# Patient Record
Sex: MALE | Race: WHITE | Employment: FULL TIME | URBAN - NONMETROPOLITAN AREA
[De-identification: names, ages, dates, MRNs, and addresses within clinical notes are randomized per-mention and may not be internally consistent; named-entity substitution may affect disease eponyms.]

---

## 2017-01-09 ENCOUNTER — TELEPHONE (OUTPATIENT)
Age: 62
End: 2017-01-09

## 2017-01-10 ENCOUNTER — TELEPHONE (OUTPATIENT)
Age: 62
End: 2017-01-10

## 2017-01-19 ENCOUNTER — OFFICE VISIT (OUTPATIENT)
Age: 62
End: 2017-01-19

## 2017-01-19 VITALS
BODY MASS INDEX: 26.18 KG/M2 | SYSTOLIC BLOOD PRESSURE: 134 MMHG | HEART RATE: 65 BPM | RESPIRATION RATE: 16 BRPM | TEMPERATURE: 98.6 F | WEIGHT: 193.3 LBS | OXYGEN SATURATION: 97 % | DIASTOLIC BLOOD PRESSURE: 72 MMHG | HEIGHT: 72 IN

## 2017-01-19 DIAGNOSIS — Z09 S/P RIGHT INGUINAL HERNIA REPAIR, FOLLOW-UP EXAM: Primary | ICD-10-CM

## 2017-01-19 PROCEDURE — 99024 POSTOP FOLLOW-UP VISIT: CPT | Performed by: NURSE PRACTITIONER

## 2017-01-19 RX ORDER — OXYCODONE HYDROCHLORIDE AND ACETAMINOPHEN 5; 325 MG/1; MG/1
1 TABLET ORAL EVERY 6 HOURS PRN
Qty: 20 TABLET | Refills: 0 | Status: SHIPPED | OUTPATIENT
Start: 2017-01-19 | End: 2017-02-07 | Stop reason: ALTCHOICE

## 2017-01-19 ASSESSMENT — ENCOUNTER SYMPTOMS
TROUBLE SWALLOWING: 0
EYE DISCHARGE: 0
SORE THROAT: 0
SINUS PRESSURE: 0
PHOTOPHOBIA: 0
CHEST TIGHTNESS: 0
CONSTIPATION: 0
SHORTNESS OF BREATH: 0
EYE PAIN: 0
APNEA: 0
ABDOMINAL PAIN: 0
DIARRHEA: 0
ANAL BLEEDING: 0
RECTAL PAIN: 0
CHOKING: 0
BACK PAIN: 0
FACIAL SWELLING: 0
STRIDOR: 0
COLOR CHANGE: 0
COUGH: 0
BLOOD IN STOOL: 0
NAUSEA: 0
EYE ITCHING: 0
WHEEZING: 0
RHINORRHEA: 0
EYE REDNESS: 0
VOMITING: 0
VOICE CHANGE: 0
ABDOMINAL DISTENTION: 0

## 2017-02-07 ENCOUNTER — OFFICE VISIT (OUTPATIENT)
Age: 62
End: 2017-02-07

## 2017-02-07 VITALS
DIASTOLIC BLOOD PRESSURE: 70 MMHG | RESPIRATION RATE: 16 BRPM | OXYGEN SATURATION: 96 % | BODY MASS INDEX: 25.35 KG/M2 | WEIGHT: 187.2 LBS | SYSTOLIC BLOOD PRESSURE: 118 MMHG | TEMPERATURE: 98.4 F | HEIGHT: 72 IN | HEART RATE: 76 BPM

## 2017-02-07 DIAGNOSIS — Z87.19 S/P RIGHT INGUINAL HERNIA REPAIR: Primary | ICD-10-CM

## 2017-02-07 DIAGNOSIS — Z98.890 S/P RIGHT INGUINAL HERNIA REPAIR: Primary | ICD-10-CM

## 2017-02-07 PROCEDURE — 99024 POSTOP FOLLOW-UP VISIT: CPT | Performed by: NURSE PRACTITIONER

## 2017-02-07 RX ORDER — IBUPROFEN 200 MG
200 TABLET ORAL PRN
COMMUNITY

## 2017-02-07 ASSESSMENT — ENCOUNTER SYMPTOMS
BLOOD IN STOOL: 0
EYE REDNESS: 0
RHINORRHEA: 0
FACIAL SWELLING: 0
EYE DISCHARGE: 0
SHORTNESS OF BREATH: 0
CHOKING: 0
CONSTIPATION: 0
TROUBLE SWALLOWING: 0
DIARRHEA: 0
BACK PAIN: 0
VOICE CHANGE: 0
VOMITING: 0
EYE ITCHING: 0
COUGH: 0
WHEEZING: 0
SORE THROAT: 0
RECTAL PAIN: 0
PHOTOPHOBIA: 0
ABDOMINAL PAIN: 0
COLOR CHANGE: 0
APNEA: 0
SINUS PRESSURE: 0
ABDOMINAL DISTENTION: 0
CHEST TIGHTNESS: 0
NAUSEA: 0
STRIDOR: 0
EYE PAIN: 0
ANAL BLEEDING: 0

## 2024-05-20 ENCOUNTER — APPOINTMENT (OUTPATIENT)
Dept: CT IMAGING | Age: 69
End: 2024-05-20
Payer: MEDICARE

## 2024-05-20 ENCOUNTER — HOSPITAL ENCOUNTER (INPATIENT)
Age: 69
LOS: 4 days | Discharge: HOME | End: 2024-05-25
Attending: EMERGENCY MEDICINE | Admitting: SURGERY
Payer: MEDICARE

## 2024-05-20 ENCOUNTER — APPOINTMENT (OUTPATIENT)
Dept: GENERAL RADIOLOGY | Age: 69
End: 2024-05-20
Payer: MEDICARE

## 2024-05-20 DIAGNOSIS — J94.2 HEMOTHORAX ON RIGHT: ICD-10-CM

## 2024-05-20 DIAGNOSIS — S22.41XA MULTIPLE FRACTURES OF RIBS, RIGHT SIDE, INITIAL ENCOUNTER FOR CLOSED FRACTURE: Primary | ICD-10-CM

## 2024-05-20 DIAGNOSIS — I48.91 ATRIAL FIBRILLATION WITH RVR (HCC): ICD-10-CM
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
## 2024-05-20 PROCEDURE — 6820000001 HC L2 TRAUMA SURGERY EVALUATION: Performed by: SURGERY

## 2024-05-20 PROCEDURE — 99222 1ST HOSP IP/OBS MODERATE 55: CPT | Performed by: SURGERY

## 2024-05-20 PROCEDURE — 2580000003 HC RX 258: Performed by: EMERGENCY MEDICINE

## 2024-05-20 PROCEDURE — 6360000002 HC RX W HCPCS: Performed by: EMERGENCY MEDICINE

## 2024-05-20 PROCEDURE — 74177 CT ABD & PELVIS W/CONTRAST: CPT

## 2024-05-20 PROCEDURE — 80048 BASIC METABOLIC PNL TOTAL CA: CPT

## 2024-05-20 PROCEDURE — 6360000004 HC RX CONTRAST MEDICATION: Performed by: EMERGENCY MEDICINE

## 2024-05-20 PROCEDURE — 96374 THER/PROPH/DIAG INJ IV PUSH: CPT

## 2024-05-20 PROCEDURE — 99285 EMERGENCY DEPT VISIT HI MDM: CPT

## 2024-05-20 PROCEDURE — 36415 COLL VENOUS BLD VENIPUNCTURE: CPT

## 2024-05-20 PROCEDURE — 85025 COMPLETE CBC W/AUTO DIFF WBC: CPT

## 2024-05-20 RX ORDER — 0.9 % SODIUM CHLORIDE 0.9 %
250 INTRAVENOUS SOLUTION INTRAVENOUS ONCE
Status: COMPLETED | OUTPATIENT
Start: 2024-05-21 | End: 2024-05-21

## 2024-05-20 RX ORDER — MORPHINE SULFATE 4 MG/ML
4 INJECTION, SOLUTION INTRAMUSCULAR; INTRAVENOUS ONCE
Status: COMPLETED | OUTPATIENT
Start: 2024-05-21 | End: 2024-05-20

## 2024-05-20 RX ADMIN — MORPHINE SULFATE 4 MG: 4 INJECTION, SOLUTION INTRAMUSCULAR; INTRAVENOUS at 23:53

## 2024-05-20 RX ADMIN — IOPAMIDOL 80 ML: 755 INJECTION, SOLUTION INTRAVENOUS at 23:25

## 2024-05-20 RX ADMIN — SODIUM CHLORIDE 250 ML: 9 INJECTION, SOLUTION INTRAVENOUS at 23:55

## 2024-05-20 ASSESSMENT — PAIN - FUNCTIONAL ASSESSMENT
PAIN_FUNCTIONAL_ASSESSMENT: NONE - DENIES PAIN
PAIN_FUNCTIONAL_ASSESSMENT: NONE - DENIES PAIN

## 2024-05-21 ENCOUNTER — APPOINTMENT (OUTPATIENT)
Dept: CT IMAGING | Age: 69
End: 2024-05-21
Payer: MEDICARE

## 2024-05-21 ENCOUNTER — APPOINTMENT (OUTPATIENT)
Dept: GENERAL RADIOLOGY | Age: 69
End: 2024-05-21
Payer: MEDICARE

## 2024-05-21 PROBLEM — F10.90 ALCOHOL USE DISORDER: Status: ACTIVE | Noted: 2024-05-21
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
## 2024-05-21 PROBLEM — S20.211A: Status: ACTIVE | Noted: 2024-05-21

## 2024-05-21 PROBLEM — F17.200 NICOTINE DEPENDENCE WITH CURRENT USE: Status: ACTIVE | Noted: 2024-05-21

## 2024-05-21 PROBLEM — S80.10XA: Status: ACTIVE | Noted: 2024-05-21

## 2024-05-21 PROBLEM — T79.7XXA SUBCUTANEOUS EMPHYSEMA DUE TO TRAUMA, INITIAL ENCOUNTER (HCC): Status: ACTIVE | Noted: 2024-05-21

## 2024-05-21 LAB
ALBUMIN SERPL BCG-MCNC: 2.3 G/DL (ref 3.5–5.1)
ALP SERPL-CCNC: 52 U/L (ref 38–126)
ALT SERPL W/O P-5'-P-CCNC: 8 U/L (ref 11–66)
ANION GAP SERPL CALC-SCNC: 10 MEQ/L (ref 8–16)
ANION GAP SERPL CALC-SCNC: 13 MEQ/L (ref 8–16)
ANION GAP SERPL CALC-SCNC: 9 MEQ/L (ref 8–16)
ANION GAP SERPL CALC-SCNC: 9 MEQ/L (ref 8–16)
APTT PPP: 32.8 SECONDS (ref 22–38)
AST SERPL-CCNC: 13 U/L (ref 5–40)
BASOPHILS ABSOLUTE: 0.1 THOU/MM3 (ref 0–0.1)
BASOPHILS ABSOLUTE: 0.1 THOU/MM3 (ref 0–0.1)
BASOPHILS NFR BLD AUTO: 0.4 %
BASOPHILS NFR BLD AUTO: 0.5 %
BILIRUB CONJ SERPL-MCNC: < 0.2 MG/DL (ref 0–0.3)
BILIRUB SERPL-MCNC: 0.3 MG/DL (ref 0.3–1.2)
BUN SERPL-MCNC: 13 MG/DL (ref 7–22)
BUN SERPL-MCNC: 14 MG/DL (ref 7–22)
BUN SERPL-MCNC: 14 MG/DL (ref 7–22)
BUN SERPL-MCNC: 15 MG/DL (ref 7–22)
CA-I BLD ISE-SCNC: 1.11 MMOL/L (ref 1.12–1.32)
CALCIUM SERPL-MCNC: 7.5 MG/DL (ref 8.5–10.5)
CALCIUM SERPL-MCNC: 8.3 MG/DL (ref 8.5–10.5)
CALCIUM SERPL-MCNC: 8.8 MG/DL (ref 8.5–10.5)
CALCIUM SERPL-MCNC: 9.6 MG/DL (ref 8.5–10.5)
CHLORIDE SERPL-SCNC: 100 MEQ/L (ref 98–111)
CHLORIDE SERPL-SCNC: 95 MEQ/L (ref 98–111)
CHLORIDE SERPL-SCNC: 95 MEQ/L (ref 98–111)
CHLORIDE SERPL-SCNC: 96 MEQ/L (ref 98–111)
CO2 SERPL-SCNC: 22 MEQ/L (ref 23–33)
CO2 SERPL-SCNC: 22 MEQ/L (ref 23–33)
CO2 SERPL-SCNC: 23 MEQ/L (ref 23–33)
CO2 SERPL-SCNC: 26 MEQ/L (ref 23–33)
CREAT SERPL-MCNC: 0.5 MG/DL (ref 0.4–1.2)
CREAT SERPL-MCNC: 0.7 MG/DL (ref 0.4–1.2)
CREAT UR-MCNC: 186.6 MG/DL
DEPRECATED RDW RBC AUTO: 50.4 FL (ref 35–45)
DEPRECATED RDW RBC AUTO: 51.4 FL (ref 35–45)
EKG ATRIAL RATE: 79 BPM
EKG P AXIS: 63 DEGREES
EKG P-R INTERVAL: 156 MS
EKG Q-T INTERVAL: 420 MS
EKG QRS DURATION: 102 MS
EKG QTC CALCULATION (BAZETT): 481 MS
EKG R AXIS: 54 DEGREES
EKG T AXIS: 52 DEGREES
EKG VENTRICULAR RATE: 79 BPM
EOSINOPHIL NFR BLD AUTO: 0 %
EOSINOPHIL NFR BLD AUTO: 1 %
EOSINOPHILS ABSOLUTE: 0 THOU/MM3 (ref 0–0.4)
EOSINOPHILS ABSOLUTE: 0.2 THOU/MM3 (ref 0–0.4)
ERYTHROCYTE [DISTWIDTH] IN BLOOD BY AUTOMATED COUNT: 13.2 % (ref 11.5–14.5)
ERYTHROCYTE [DISTWIDTH] IN BLOOD BY AUTOMATED COUNT: 13.3 % (ref 11.5–14.5)
GFR SERPL CREATININE-BSD FRML MDRD: > 90 ML/MIN/1.73M2
GLUCOSE SERPL-MCNC: 107 MG/DL (ref 70–108)
GLUCOSE SERPL-MCNC: 123 MG/DL (ref 70–108)
GLUCOSE SERPL-MCNC: 125 MG/DL (ref 70–108)
GLUCOSE SERPL-MCNC: 126 MG/DL (ref 70–108)
HCT VFR BLD AUTO: 35.8 % (ref 42–52)
HCT VFR BLD AUTO: 37.6 % (ref 42–52)
HCT VFR BLD AUTO: 38.4 % (ref 42–52)
HEPARIN UNFRACTIONATED: < 0.04 U/ML (ref 0.3–0.7)
HGB BLD-MCNC: 12.3 GM/DL (ref 14–18)
HGB BLD-MCNC: 12.8 GM/DL (ref 14–18)
HGB BLD-MCNC: 13.2 GM/DL (ref 14–18)
IMM GRANULOCYTES # BLD AUTO: 0.28 THOU/MM3 (ref 0–0.07)
IMM GRANULOCYTES # BLD AUTO: 0.46 THOU/MM3 (ref 0–0.07)
IMM GRANULOCYTES NFR BLD AUTO: 1.8 %
IMM GRANULOCYTES NFR BLD AUTO: 1.9 %
INR PPP: 0.99 (ref 0.85–1.13)
LYMPHOCYTES ABSOLUTE: 0.8 THOU/MM3 (ref 1–4.8)
LYMPHOCYTES ABSOLUTE: 0.9 THOU/MM3 (ref 1–4.8)
LYMPHOCYTES NFR BLD AUTO: 3.6 %
LYMPHOCYTES NFR BLD AUTO: 5.3 %
MAGNESIUM SERPL-MCNC: 1.8 MG/DL (ref 1.6–2.4)
MCH RBC QN AUTO: 36.1 PG (ref 26–33)
MCH RBC QN AUTO: 36.5 PG (ref 26–33)
MCHC RBC AUTO-ENTMCNC: 34.4 GM/DL (ref 32.2–35.5)
MCHC RBC AUTO-ENTMCNC: 35.1 GM/DL (ref 32.2–35.5)
MCV RBC AUTO: 103.9 FL (ref 80–94)
MCV RBC AUTO: 105 FL (ref 80–94)
MONOCYTES ABSOLUTE: 2 THOU/MM3 (ref 0.4–1.3)
MONOCYTES ABSOLUTE: 2.3 THOU/MM3 (ref 0.4–1.3)
MONOCYTES NFR BLD AUTO: 12.6 %
MONOCYTES NFR BLD AUTO: 9.4 %
MRSA DNA SPEC QL NAA+PROBE: NEGATIVE
NEUTROPHILS ABSOLUTE: 12.2 THOU/MM3 (ref 1.8–7.7)
NEUTROPHILS ABSOLUTE: 20.8 THOU/MM3 (ref 1.8–7.7)
NEUTROPHILS NFR BLD AUTO: 78.8 %
NEUTROPHILS NFR BLD AUTO: 84.7 %
NRBC BLD AUTO-RTO: 0 /100 WBC
NRBC BLD AUTO-RTO: 0 /100 WBC
NT-PROBNP SERPL IA-MCNC: 3205 PG/ML (ref 0–124)
OSMOLALITY SERPL CALC.SUM OF ELEC: 264.5 MOSMOL/KG (ref 275–300)
OSMOLALITY SERPL: NORMAL MOSMOL/KG (ref 275–295)
OSMOLALITY UR: NORMAL MOSMOL/KG (ref 250–750)
PATHOLOGIST REVIEW: ABNORMAL
PH BLDV: 7.42 [PH] (ref 7.31–7.41)
PHOSPHATE SERPL-MCNC: 2.5 MG/DL (ref 2.4–4.7)
PLATELET # BLD AUTO: 275 THOU/MM3 (ref 130–400)
PLATELET # BLD AUTO: 311 THOU/MM3 (ref 130–400)
PLATELET BLD QL SMEAR: ADEQUATE
PMV BLD AUTO: 8.9 FL (ref 9.4–12.4)
PMV BLD AUTO: 9 FL (ref 9.4–12.4)
POTASSIUM SERPL-SCNC: 3.9 MEQ/L (ref 3.5–5.2)
POTASSIUM SERPL-SCNC: 4.7 MEQ/L (ref 3.5–5.2)
POTASSIUM SERPL-SCNC: 4.8 MEQ/L (ref 3.5–5.2)
POTASSIUM SERPL-SCNC: 4.9 MEQ/L (ref 3.5–5.2)
PROT SERPL-MCNC: 4.5 G/DL (ref 6.1–8)
RBC # BLD AUTO: 3.41 MILL/MM3 (ref 4.7–6.1)
RBC # BLD AUTO: 3.62 MILL/MM3 (ref 4.7–6.1)
SCAN OF BLOOD SMEAR: NORMAL
SODIUM SERPL-SCNC: 128 MEQ/L (ref 135–145)
SODIUM SERPL-SCNC: 130 MEQ/L (ref 135–145)
SODIUM SERPL-SCNC: 131 MEQ/L (ref 135–145)
SODIUM SERPL-SCNC: 131 MEQ/L (ref 135–145)
SODIUM UR-SCNC: 75 MEQ/L
TROPONIN, HIGH SENSITIVITY: 21 NG/L (ref 0–12)
TROPONIN, HIGH SENSITIVITY: 23 NG/L (ref 0–12)
TROPONIN, HIGH SENSITIVITY: 26 NG/L (ref 0–12)
TROPONIN, HIGH SENSITIVITY: 32 NG/L (ref 0–12)
TROPONIN, HIGH SENSITIVITY: 36 NG/L (ref 0–12)
TSH SERPL DL<=0.005 MIU/L-ACNC: 1.82 UIU/ML (ref 0.4–4.2)
URATE SERPL-MCNC: 3.6 MG/DL (ref 3.7–7)
WBC # BLD AUTO: 15.5 THOU/MM3 (ref 4.8–10.8)
WBC # BLD AUTO: 24.5 THOU/MM3 (ref 4.8–10.8)

## 2024-05-21 PROCEDURE — 71045 X-RAY EXAM CHEST 1 VIEW: CPT

## 2024-05-21 PROCEDURE — 6360000002 HC RX W HCPCS

## 2024-05-21 PROCEDURE — 2500000003 HC RX 250 WO HCPCS

## 2024-05-21 PROCEDURE — 2500000003 HC RX 250 WO HCPCS: Performed by: NURSE PRACTITIONER

## 2024-05-21 PROCEDURE — 6360000002 HC RX W HCPCS: Performed by: NURSE PRACTITIONER

## 2024-05-21 PROCEDURE — 93005 ELECTROCARDIOGRAM TRACING: CPT

## 2024-05-21 PROCEDURE — 85014 HEMATOCRIT: CPT

## 2024-05-21 PROCEDURE — 99223 1ST HOSP IP/OBS HIGH 75: CPT | Performed by: NURSE PRACTITIONER

## 2024-05-21 PROCEDURE — 93005 ELECTROCARDIOGRAM TRACING: CPT | Performed by: SURGERY

## 2024-05-21 PROCEDURE — 2580000003 HC RX 258: Performed by: NURSE PRACTITIONER

## 2024-05-21 PROCEDURE — 85610 PROTHROMBIN TIME: CPT

## 2024-05-21 PROCEDURE — 82248 BILIRUBIN DIRECT: CPT

## 2024-05-21 PROCEDURE — 85730 THROMBOPLASTIN TIME PARTIAL: CPT

## 2024-05-21 PROCEDURE — 6360000002 HC RX W HCPCS: Performed by: RADIOLOGY

## 2024-05-21 PROCEDURE — 83735 ASSAY OF MAGNESIUM: CPT

## 2024-05-21 PROCEDURE — 1200000003 HC TELEMETRY R&B

## 2024-05-21 PROCEDURE — 93010 ELECTROCARDIOGRAM REPORT: CPT | Performed by: INTERNAL MEDICINE

## 2024-05-21 PROCEDURE — 84100 ASSAY OF PHOSPHORUS: CPT

## 2024-05-21 PROCEDURE — 82800 BLOOD PH: CPT

## 2024-05-21 PROCEDURE — 85018 HEMOGLOBIN: CPT

## 2024-05-21 PROCEDURE — 1200000000 HC SEMI PRIVATE

## 2024-05-21 PROCEDURE — 84443 ASSAY THYROID STIM HORMONE: CPT

## 2024-05-21 PROCEDURE — 6370000000 HC RX 637 (ALT 250 FOR IP): Performed by: NURSE PRACTITIONER

## 2024-05-21 PROCEDURE — 85025 COMPLETE CBC W/AUTO DIFF WBC: CPT

## 2024-05-21 PROCEDURE — 84300 ASSAY OF URINE SODIUM: CPT

## 2024-05-21 PROCEDURE — 0W9930Z DRAINAGE OF RIGHT PLEURAL CAVITY WITH DRAINAGE DEVICE, PERCUTANEOUS APPROACH: ICD-10-PCS | Performed by: RADIOLOGY

## 2024-05-21 PROCEDURE — 32557 INSERT CATH PLEURA W/ IMAGE: CPT

## 2024-05-21 PROCEDURE — 6370000000 HC RX 637 (ALT 250 FOR IP)

## 2024-05-21 PROCEDURE — 85520 HEPARIN ASSAY: CPT

## 2024-05-21 PROCEDURE — 84484 ASSAY OF TROPONIN QUANT: CPT

## 2024-05-21 PROCEDURE — 36415 COLL VENOUS BLD VENIPUNCTURE: CPT

## 2024-05-21 PROCEDURE — 83935 ASSAY OF URINE OSMOLALITY: CPT

## 2024-05-21 PROCEDURE — 84550 ASSAY OF BLOOD/URIC ACID: CPT

## 2024-05-21 PROCEDURE — 82330 ASSAY OF CALCIUM: CPT

## 2024-05-21 PROCEDURE — 83880 ASSAY OF NATRIURETIC PEPTIDE: CPT

## 2024-05-21 PROCEDURE — 83930 ASSAY OF BLOOD OSMOLALITY: CPT

## 2024-05-21 PROCEDURE — 94799 UNLISTED PULMONARY SVC/PX: CPT

## 2024-05-21 PROCEDURE — 99232 SBSQ HOSP IP/OBS MODERATE 35: CPT | Performed by: SURGERY

## 2024-05-21 PROCEDURE — 87641 MR-STAPH DNA AMP PROBE: CPT

## 2024-05-21 PROCEDURE — 94010 BREATHING CAPACITY TEST: CPT

## 2024-05-21 PROCEDURE — 70450 CT HEAD/BRAIN W/O DYE: CPT

## 2024-05-21 PROCEDURE — 80053 COMPREHEN METABOLIC PANEL: CPT

## 2024-05-21 PROCEDURE — 82570 ASSAY OF URINE CREATININE: CPT

## 2024-05-21 RX ORDER — SODIUM CHLORIDE 0.9 % (FLUSH) 0.9 %
5-40 SYRINGE (ML) INJECTION EVERY 12 HOURS SCHEDULED
Status: DISCONTINUED | OUTPATIENT
Start: 2024-05-21 | End: 2024-05-25 | Stop reason: HOSPADM

## 2024-05-21 RX ORDER — LORAZEPAM 1 MG/1
4 TABLET ORAL
Status: DISCONTINUED | OUTPATIENT
Start: 2024-05-21 | End: 2024-05-25 | Stop reason: HOSPADM

## 2024-05-21 RX ORDER — VALSARTAN 320 MG/1
320 TABLET ORAL DAILY
COMMUNITY
Start: 2023-12-18

## 2024-05-21 RX ORDER — HEPARIN SODIUM 1000 [USP'U]/ML
4000 INJECTION, SOLUTION INTRAVENOUS; SUBCUTANEOUS PRN
Status: DISCONTINUED | OUTPATIENT
Start: 2024-05-21 | End: 2024-05-24

## 2024-05-21 RX ORDER — VALSARTAN 320 MG/1
320 TABLET ORAL DAILY
Status: DISCONTINUED | OUTPATIENT
Start: 2024-05-21 | End: 2024-05-23

## 2024-05-21 RX ORDER — LANOLIN ALCOHOL/MO/W.PET/CERES
100 CREAM (GRAM) TOPICAL DAILY
Status: DISCONTINUED | OUTPATIENT
Start: 2024-05-21 | End: 2024-05-25 | Stop reason: HOSPADM

## 2024-05-21 RX ORDER — LORAZEPAM 2 MG/ML
3 INJECTION INTRAMUSCULAR
Status: DISCONTINUED | OUTPATIENT
Start: 2024-05-21 | End: 2024-05-25 | Stop reason: HOSPADM

## 2024-05-21 RX ORDER — SODIUM CHLORIDE 0.9 % (FLUSH) 0.9 %
5-40 SYRINGE (ML) INJECTION EVERY 12 HOURS SCHEDULED
Status: DISCONTINUED | OUTPATIENT
Start: 2024-05-21 | End: 2024-05-21 | Stop reason: SDUPTHER

## 2024-05-21 RX ORDER — PAROXETINE HYDROCHLORIDE 20 MG/1
20 TABLET, FILM COATED ORAL EVERY MORNING
Status: DISCONTINUED | OUTPATIENT
Start: 2024-05-22 | End: 2024-05-25 | Stop reason: HOSPADM

## 2024-05-21 RX ORDER — LORAZEPAM 1 MG/1
1 TABLET ORAL
Status: DISCONTINUED | OUTPATIENT
Start: 2024-05-21 | End: 2024-05-25 | Stop reason: HOSPADM

## 2024-05-21 RX ORDER — LORAZEPAM 1 MG/1
2 TABLET ORAL
Status: DISCONTINUED | OUTPATIENT
Start: 2024-05-21 | End: 2024-05-25 | Stop reason: HOSPADM

## 2024-05-21 RX ORDER — POLYETHYLENE GLYCOL 3350 17 G/17G
17 POWDER, FOR SOLUTION ORAL DAILY
Status: DISCONTINUED | OUTPATIENT
Start: 2024-05-21 | End: 2024-05-25 | Stop reason: HOSPADM

## 2024-05-21 RX ORDER — POTASSIUM CHLORIDE 29.8 MG/ML
20 INJECTION INTRAVENOUS PRN
Status: DISCONTINUED | OUTPATIENT
Start: 2024-05-21 | End: 2024-05-25 | Stop reason: HOSPADM

## 2024-05-21 RX ORDER — MIDAZOLAM HYDROCHLORIDE 1 MG/ML
INJECTION INTRAMUSCULAR; INTRAVENOUS PRN
Status: COMPLETED | OUTPATIENT
Start: 2024-05-21 | End: 2024-05-21

## 2024-05-21 RX ORDER — LORAZEPAM 2 MG/ML
2 INJECTION INTRAMUSCULAR
Status: DISCONTINUED | OUTPATIENT
Start: 2024-05-21 | End: 2024-05-25 | Stop reason: HOSPADM

## 2024-05-21 RX ORDER — FENTANYL CITRATE 50 UG/ML
INJECTION, SOLUTION INTRAMUSCULAR; INTRAVENOUS PRN
Status: COMPLETED | OUTPATIENT
Start: 2024-05-21 | End: 2024-05-21

## 2024-05-21 RX ORDER — LORAZEPAM 2 MG/ML
INJECTION INTRAMUSCULAR
Status: COMPLETED
Start: 2024-05-21 | End: 2024-05-21

## 2024-05-21 RX ORDER — ONDANSETRON 4 MG/1
4 TABLET, ORALLY DISINTEGRATING ORAL EVERY 8 HOURS PRN
Status: DISCONTINUED | OUTPATIENT
Start: 2024-05-21 | End: 2024-05-25 | Stop reason: HOSPADM

## 2024-05-21 RX ORDER — SODIUM CHLORIDE 0.9 % (FLUSH) 0.9 %
5-40 SYRINGE (ML) INJECTION PRN
Status: DISCONTINUED | OUTPATIENT
Start: 2024-05-21 | End: 2024-05-21 | Stop reason: SDUPTHER

## 2024-05-21 RX ORDER — SODIUM CHLORIDE 9 MG/ML
INJECTION, SOLUTION INTRAVENOUS CONTINUOUS
Status: DISCONTINUED | OUTPATIENT
Start: 2024-05-21 | End: 2024-05-21

## 2024-05-21 RX ORDER — LORAZEPAM 1 MG/1
3 TABLET ORAL
Status: DISCONTINUED | OUTPATIENT
Start: 2024-05-21 | End: 2024-05-25 | Stop reason: HOSPADM

## 2024-05-21 RX ORDER — SODIUM CHLORIDE 0.9 % (FLUSH) 0.9 %
5-40 SYRINGE (ML) INJECTION PRN
Status: DISCONTINUED | OUTPATIENT
Start: 2024-05-21 | End: 2024-05-25 | Stop reason: HOSPADM

## 2024-05-21 RX ORDER — SODIUM CHLORIDE 9 MG/ML
INJECTION, SOLUTION INTRAVENOUS PRN
Status: DISCONTINUED | OUTPATIENT
Start: 2024-05-21 | End: 2024-05-21 | Stop reason: SDUPTHER

## 2024-05-21 RX ORDER — DILTIAZEM HYDROCHLORIDE 5 MG/ML
20 INJECTION INTRAVENOUS ONCE
Status: COMPLETED | OUTPATIENT
Start: 2024-05-21 | End: 2024-05-21

## 2024-05-21 RX ORDER — METHOCARBAMOL 500 MG/1
1000 TABLET, FILM COATED ORAL 4 TIMES DAILY
Status: DISCONTINUED | OUTPATIENT
Start: 2024-05-21 | End: 2024-05-25 | Stop reason: HOSPADM

## 2024-05-21 RX ORDER — POTASSIUM CHLORIDE 7.45 MG/ML
10 INJECTION INTRAVENOUS PRN
Status: DISCONTINUED | OUTPATIENT
Start: 2024-05-21 | End: 2024-05-25 | Stop reason: HOSPADM

## 2024-05-21 RX ORDER — HEPARIN SODIUM 1000 [USP'U]/ML
4000 INJECTION, SOLUTION INTRAVENOUS; SUBCUTANEOUS ONCE
Status: COMPLETED | OUTPATIENT
Start: 2024-05-21 | End: 2024-05-21

## 2024-05-21 RX ORDER — LORAZEPAM 2 MG/ML
1 INJECTION INTRAMUSCULAR
Status: DISCONTINUED | OUTPATIENT
Start: 2024-05-21 | End: 2024-05-25 | Stop reason: HOSPADM

## 2024-05-21 RX ORDER — CALCIUM GLUCONATE 20 MG/ML
2000 INJECTION, SOLUTION INTRAVENOUS PRN
Status: DISCONTINUED | OUTPATIENT
Start: 2024-05-21 | End: 2024-05-25 | Stop reason: HOSPADM

## 2024-05-21 RX ORDER — SODIUM CHLORIDE 9 MG/ML
INJECTION, SOLUTION INTRAVENOUS PRN
Status: DISCONTINUED | OUTPATIENT
Start: 2024-05-21 | End: 2024-05-25 | Stop reason: HOSPADM

## 2024-05-21 RX ORDER — ONDANSETRON 2 MG/ML
4 INJECTION INTRAMUSCULAR; INTRAVENOUS EVERY 6 HOURS PRN
Status: DISCONTINUED | OUTPATIENT
Start: 2024-05-21 | End: 2024-05-25 | Stop reason: HOSPADM

## 2024-05-21 RX ORDER — HEPARIN SODIUM 1000 [USP'U]/ML
2000 INJECTION, SOLUTION INTRAVENOUS; SUBCUTANEOUS PRN
Status: DISCONTINUED | OUTPATIENT
Start: 2024-05-21 | End: 2024-05-24

## 2024-05-21 RX ORDER — LORAZEPAM 2 MG/ML
4 INJECTION INTRAMUSCULAR
Status: DISCONTINUED | OUTPATIENT
Start: 2024-05-21 | End: 2024-05-25 | Stop reason: HOSPADM

## 2024-05-21 RX ORDER — FAMOTIDINE 20 MG/1
20 TABLET, FILM COATED ORAL 2 TIMES DAILY
Status: DISCONTINUED | OUTPATIENT
Start: 2024-05-21 | End: 2024-05-25 | Stop reason: HOSPADM

## 2024-05-21 RX ORDER — MORPHINE SULFATE 4 MG/ML
4 INJECTION, SOLUTION INTRAMUSCULAR; INTRAVENOUS
Status: DISCONTINUED | OUTPATIENT
Start: 2024-05-21 | End: 2024-05-25 | Stop reason: HOSPADM

## 2024-05-21 RX ORDER — MAGNESIUM SULFATE IN WATER 40 MG/ML
2000 INJECTION, SOLUTION INTRAVENOUS PRN
Status: DISCONTINUED | OUTPATIENT
Start: 2024-05-21 | End: 2024-05-25 | Stop reason: HOSPADM

## 2024-05-21 RX ORDER — MULTIVITAMIN WITH IRON
1 TABLET ORAL DAILY
Status: DISCONTINUED | OUTPATIENT
Start: 2024-05-21 | End: 2024-05-25 | Stop reason: HOSPADM

## 2024-05-21 RX ORDER — FOLIC ACID 1 MG/1
1 TABLET ORAL DAILY
Status: DISCONTINUED | OUTPATIENT
Start: 2024-05-21 | End: 2024-05-25 | Stop reason: HOSPADM

## 2024-05-21 RX ORDER — ACETAMINOPHEN 325 MG/1
650 TABLET ORAL EVERY 4 HOURS PRN
Status: DISCONTINUED | OUTPATIENT
Start: 2024-05-21 | End: 2024-05-24

## 2024-05-21 RX ORDER — LIDOCAINE 4 G/G
3 PATCH TOPICAL DAILY
Status: DISCONTINUED | OUTPATIENT
Start: 2024-05-21 | End: 2024-05-25 | Stop reason: HOSPADM

## 2024-05-21 RX ORDER — METOPROLOL TARTRATE 1 MG/ML
INJECTION, SOLUTION INTRAVENOUS
Status: COMPLETED
Start: 2024-05-21 | End: 2024-05-21

## 2024-05-21 RX ORDER — HYDROCODONE BITARTRATE AND ACETAMINOPHEN 5; 325 MG/1; MG/1
2 TABLET ORAL EVERY 4 HOURS PRN
Status: DISCONTINUED | OUTPATIENT
Start: 2024-05-21 | End: 2024-05-24

## 2024-05-21 RX ORDER — MORPHINE SULFATE 2 MG/ML
2 INJECTION, SOLUTION INTRAMUSCULAR; INTRAVENOUS
Status: DISCONTINUED | OUTPATIENT
Start: 2024-05-21 | End: 2024-05-25 | Stop reason: HOSPADM

## 2024-05-21 RX ORDER — METOPROLOL TARTRATE 1 MG/ML
5 INJECTION, SOLUTION INTRAVENOUS EVERY 6 HOURS PRN
Status: DISCONTINUED | OUTPATIENT
Start: 2024-05-21 | End: 2024-05-25 | Stop reason: HOSPADM

## 2024-05-21 RX ORDER — HEPARIN SODIUM 10000 [USP'U]/100ML
5-30 INJECTION, SOLUTION INTRAVENOUS CONTINUOUS
Status: DISCONTINUED | OUTPATIENT
Start: 2024-05-21 | End: 2024-05-23

## 2024-05-21 RX ORDER — HYDROCODONE BITARTRATE AND ACETAMINOPHEN 5; 325 MG/1; MG/1
1 TABLET ORAL EVERY 4 HOURS PRN
Status: DISCONTINUED | OUTPATIENT
Start: 2024-05-21 | End: 2024-05-24

## 2024-05-21 RX ORDER — LORAZEPAM 2 MG/ML
1 INJECTION INTRAMUSCULAR ONCE
Status: COMPLETED | OUTPATIENT
Start: 2024-05-21 | End: 2024-05-21

## 2024-05-21 RX ADMIN — VALSARTAN 320 MG: 320 TABLET, FILM COATED ORAL at 21:48

## 2024-05-21 RX ADMIN — METHOCARBAMOL 1000 MG: 500 TABLET ORAL at 12:34

## 2024-05-21 RX ADMIN — METOPROLOL TARTRATE 5 MG: 1 INJECTION, SOLUTION INTRAVENOUS at 17:20

## 2024-05-21 RX ADMIN — FENTANYL CITRATE 50 MCG: 50 INJECTION, SOLUTION INTRAMUSCULAR; INTRAVENOUS at 13:46

## 2024-05-21 RX ADMIN — HEPARIN SODIUM 4000 UNITS: 1000 INJECTION INTRAVENOUS; SUBCUTANEOUS at 23:01

## 2024-05-21 RX ADMIN — METOPROLOL TARTRATE 5 MG: 5 INJECTION INTRAVENOUS at 17:20

## 2024-05-21 RX ADMIN — LORAZEPAM 1 MG: 2 INJECTION INTRAMUSCULAR at 18:28

## 2024-05-21 RX ADMIN — DILTIAZEM HYDROCHLORIDE 5 MG/HR: 5 INJECTION, SOLUTION INTRAVENOUS at 21:18

## 2024-05-21 RX ADMIN — HYDROCODONE BITARTRATE AND ACETAMINOPHEN 2 TABLET: 5; 325 TABLET ORAL at 10:12

## 2024-05-21 RX ADMIN — Medication 100 MG: at 10:09

## 2024-05-21 RX ADMIN — SODIUM CHLORIDE, PRESERVATIVE FREE 10 ML: 5 INJECTION INTRAVENOUS at 10:10

## 2024-05-21 RX ADMIN — FAMOTIDINE 20 MG: 20 TABLET, FILM COATED ORAL at 21:47

## 2024-05-21 RX ADMIN — SODIUM CHLORIDE: 9 INJECTION, SOLUTION INTRAVENOUS at 10:34

## 2024-05-21 RX ADMIN — MORPHINE SULFATE 4 MG: 4 INJECTION, SOLUTION INTRAMUSCULAR; INTRAVENOUS at 12:32

## 2024-05-21 RX ADMIN — METHOCARBAMOL 1000 MG: 500 TABLET ORAL at 10:09

## 2024-05-21 RX ADMIN — METHOCARBAMOL 1000 MG: 500 TABLET ORAL at 16:34

## 2024-05-21 RX ADMIN — SODIUM CHLORIDE: 9 INJECTION, SOLUTION INTRAVENOUS at 19:43

## 2024-05-21 RX ADMIN — FOLIC ACID 1 MG: 1 TABLET ORAL at 10:09

## 2024-05-21 RX ADMIN — HEPARIN SODIUM AND DEXTROSE 11 UNITS/KG/HR: 10000; 5 INJECTION INTRAVENOUS at 23:01

## 2024-05-21 RX ADMIN — METHOCARBAMOL 1000 MG: 500 TABLET ORAL at 21:47

## 2024-05-21 RX ADMIN — FAMOTIDINE 20 MG: 20 TABLET, FILM COATED ORAL at 10:09

## 2024-05-21 RX ADMIN — MIDAZOLAM 1 MG: 1 INJECTION INTRAMUSCULAR; INTRAVENOUS at 13:46

## 2024-05-21 RX ADMIN — Medication 1 TABLET: at 10:09

## 2024-05-21 RX ADMIN — DILTIAZEM HYDROCHLORIDE 20 MG: 5 INJECTION, SOLUTION INTRAVENOUS at 21:13

## 2024-05-21 RX ADMIN — LORAZEPAM 1 MG: 2 INJECTION INTRAMUSCULAR; INTRAVENOUS at 18:28

## 2024-05-21 ASSESSMENT — PAIN DESCRIPTION - PAIN TYPE: TYPE: ACUTE PAIN

## 2024-05-21 ASSESSMENT — ENCOUNTER SYMPTOMS
VOICE CHANGE: 0
SINUS PRESSURE: 0
EYE DISCHARGE: 0
DIARRHEA: 0
ABDOMINAL DISTENTION: 0
NAUSEA: 0
COUGH: 0
RHINORRHEA: 0
EYE ITCHING: 0
CHOKING: 0
EYE PAIN: 0
APNEA: 0
ABDOMINAL PAIN: 0
SORE THROAT: 0
SHORTNESS OF BREATH: 1
COLOR CHANGE: 0
EYE REDNESS: 0
STRIDOR: 0
WHEEZING: 0
FACIAL SWELLING: 0
PHOTOPHOBIA: 0
BACK PAIN: 0
CONSTIPATION: 0
VOMITING: 0
TROUBLE SWALLOWING: 0
BLOOD IN STOOL: 0
CHEST TIGHTNESS: 1

## 2024-05-21 ASSESSMENT — PAIN DESCRIPTION - DESCRIPTORS
DESCRIPTORS: ACHING
DESCRIPTORS: TIGHTNESS

## 2024-05-21 ASSESSMENT — PATIENT HEALTH QUESTIONNAIRE - PHQ9
SUM OF ALL RESPONSES TO PHQ QUESTIONS 1-9: 0
1. LITTLE INTEREST OR PLEASURE IN DOING THINGS: NOT AT ALL
2. FEELING DOWN, DEPRESSED OR HOPELESS: NOT AT ALL
SUM OF ALL RESPONSES TO PHQ9 QUESTIONS 1 & 2: 0
SUM OF ALL RESPONSES TO PHQ QUESTIONS 1-9: 0

## 2024-05-21 ASSESSMENT — PAIN - FUNCTIONAL ASSESSMENT
PAIN_FUNCTIONAL_ASSESSMENT: PREVENTS OR INTERFERES SOME ACTIVE ACTIVITIES AND ADLS
PAIN_FUNCTIONAL_ASSESSMENT: ACTIVITIES ARE NOT PREVENTED
PAIN_FUNCTIONAL_ASSESSMENT: NONE - DENIES PAIN

## 2024-05-21 ASSESSMENT — PAIN SCALES - GENERAL
PAINLEVEL_OUTOF10: 6
PAINLEVEL_OUTOF10: 4
PAINLEVEL_OUTOF10: 6
PAINLEVEL_OUTOF10: 5
PAINLEVEL_OUTOF10: 7

## 2024-05-21 ASSESSMENT — PAIN DESCRIPTION - ORIENTATION
ORIENTATION: RIGHT
ORIENTATION: RIGHT;MID
ORIENTATION: RIGHT
ORIENTATION: RIGHT
ORIENTATION: LOWER

## 2024-05-21 ASSESSMENT — PAIN DESCRIPTION - LOCATION
LOCATION: CHEST
LOCATION: CHEST
LOCATION: RIB CAGE
LOCATION: RIB CAGE
LOCATION: FLANK

## 2024-05-21 ASSESSMENT — PAIN DESCRIPTION - FREQUENCY: FREQUENCY: CONTINUOUS

## 2024-05-21 ASSESSMENT — PAIN DESCRIPTION - ONSET: ONSET: ON-GOING

## 2024-05-21 ASSESSMENT — PAIN SCALES - WONG BAKER
WONGBAKER_NUMERICALRESPONSE: HURTS LITTLE MORE
WONGBAKER_NUMERICALRESPONSE: HURTS LITTLE MORE

## 2024-05-21 ASSESSMENT — LIFESTYLE VARIABLES
HOW MANY STANDARD DRINKS CONTAINING ALCOHOL DO YOU HAVE ON A TYPICAL DAY: 5 OR 6
HOW OFTEN DO YOU HAVE A DRINK CONTAINING ALCOHOL: 4 OR MORE TIMES A WEEK

## 2024-05-21 NOTE — ED TRIAGE NOTES
Patient presents to ED as a trauma consult from [facility]. Patient states two weeks ago he fell down a step while in [place] and landed on cement on his right side. Patient denies hitting head or losing LOC. Patient reports SOB and difficulties on inspiration. Patient was dx with multiple rib fractures and currently has a hemothorax on the lower 2/3 on the right side of chest.Patient alert and oriented x4. VSS.

## 2024-05-21 NOTE — PROGRESS NOTES
[facility]  PHYSICAL THERAPY MISSED TREATMENT NOTE  STRZ ICU STEPDOWN TELEMETRY 4K    Date: 2024  Patient Name: Lucius Day        MRN: [id number]   : 1955  (68 y.o.)  Gender: male                REASON FOR MISSED TREATMENT:  Missed Treat.  Pt off unit for drain placement, will check back tomorrow.

## 2024-05-21 NOTE — H&P
[facility]  Sedation/Analgesia History & Physical    Pt Name: Lucius Day  MRN: [id number]  YOB: 1955  Provider Performing Procedure: Josué Carrion MD, MD  Primary Care Physician: Dwayne Nava MD    Formulation and discussion of sedation / procedure plans, risks, benefits, side effects and alternatives with patient and/or responsible adult completed.    PRE-PROCEDURE   DNR-CCA/DNR-CC []Yes [x]No  Brief History/Pre-Procedure Diagnosis: Right hemothorax          MEDICAL HISTORY  []CAD/Valve  []Liver Disease  []Lung Disease []Diabetes  []Hypertension []Renal Disease  [x]Additional information:       has a past medical history of Anxiety.    SURGICAL HISTORY   has a past surgical history that includes Vasectomy (1980's early); hernia repair (2-21-14); and Inguinal hernia repair (Right, 01/06/2017).  Additional information:       ALLERGIES   Allergies as of 05/20/2024    (No Known Allergies)     Additional information:       MEDICATIONS   Coumadin Use Last 5 Days [x]No []Yes  Antiplatelet drug therapy use last 5 days  [x]No []Yes  Other anticoagulant use last 5 days  [x]No []Yes    Current Facility-Administered Medications:     sodium chloride flush 0.9 % injection 5-40 mL, 5-40 mL, IntraVENous, 2 times per day, Danelle Rogel APRN - CNP, 10 mL at 05/21/24 1010    sodium chloride flush 0.9 % injection 5-40 mL, 5-40 mL, IntraVENous, PRN, Danelle Rogel APRN - CNP    0.9 % sodium chloride infusion, , IntraVENous, PRN, Danelle Rogel APRN - CNP    potassium chloride 20 mEq/50 mL IVPB (Central Line), 20 mEq, IntraVENous, PRN **OR** potassium chloride 10 mEq/100 mL IVPB (Peripheral Line), 10 mEq, IntraVENous, PRNJuan F Katie J, APRN - CNP    magnesium sulfate 2000 mg in 50 mL IVPB premix, 2,000 mg, IntraVENous, PRN, Danelle Rogel APRN - CNP    ondansetron (ZOFRAN-ODT) disintegrating tablet 4 mg, 4 mg, Oral, Q8H PRN **OR** ondansetron (ZOFRAN) injection 4 mg, 4 mg,        PHYSICAL:   Heart:  [x]Regular rate and rhythm  []Other:    Lungs:  [x]Clear    []Other:    Abdomen: [x]Soft    []Other:    Mental Status: [x]Alert & Oriented  []Other:      PLANNED PROCEDURE   []Biospy []Arteriogram              [x]Drainage   []Mediport Insertion  []Fistulogram []IV access       []Vertebroplasty / Augmentation  []IVC filter []Dialysis catheter []Biliary drainage  []Other: []CAPD Catheter []Nephrostomy Tube / Stent  SEDATION  Planned agent:[x]Midazolam []Meperidine [x]Sublimaze []Dilaudid []Morphine     []Diazepam  []Other:     ASA Classification:  []1 [x]2 []3 []4 []5  Class 1: A normal healthy patient  Class 2: Pt with mild to moderate systemic disease  Class 3: Severe systemic disease or disturbance  Class 4: Severe systemic disorders that are already life threatening.  Class 5: Moribund pt with little chances of survival, for more than 24 hours.  Mallampati I Airway Classification:   []1 [x]2 []3 []4    [x]Pre-procedure diagnostic studies complete and results available.   Comment:    [x]Previous sedation/anesthesia experiences assessed.   Comment:    [x]The patient is an appropriate candidate to undergo the planned procedure sedation and anesthesia. (Refer to nursing sedation/analgesia documentation record)  [x]Formulation and discussion of sedation/procedure plan, risks, and expectations with patient and/or responsible adult completed.  [x]Patient examined immediately prior to the procedure. (Refer to nursing sedation/analgesia documentation record)    Josué Carrion MD, MD  Electronically signed 5/21/2024 at 3:23 PM

## 2024-05-21 NOTE — PROGRESS NOTES
1319 Pt arrived to right chest tube placement. Procedure explained using teach back method. Pt states understanding.  1330 Dr Carrion into assess patient and explain procedure.  1332 This procedure has been fully reviewed with the patient and written informed consent has been obtained.   1334 Patient assisted to table in supine position. Monitor attached to patient. Comfort ensured.  1341 Pre-procedure images obtained.  1347 Procedure begins.  1352 14Fr chest tube placed. Evacuated container attached and 1 liter dark red fluid removed. Stay fix and op-site applied to exit site.   1354 Procedure complete. Atrium attached to drain.   1357 Post-procedure images obtained.  1401 Monitor removed. Patient assisted to bed in semi fowlers position. Comfort ensured.  1406 Patient transported to 4 in stable condition. Report called to Luisito MCRAE on 4K.

## 2024-05-21 NOTE — SIGNIFICANT EVENT
Asked to evaluate patient by patient's RN Luisito. Patient currently admitted by trauma surgery after a fall, found to have a right-sided hemothorax s/p chest tube placement.  Patient has been persistently tachycardic, earlier there was a concern about possible A-fib RVR.  Evaluated patient at bedside. Patient does have a history of alcohol abuse disorder, drinks 6 beers daily as well as vodka.  Per RN, patient's wife told him that patient drinks more than 6 beers per day and drinks approximately a bottle of vodka each night as well.  On my evaluation, patient was tachycardic with a heart rate of 111, satting 93% on room air, .      Patient noted to have a history of anxiety and hypertension, is on paroxetine and valsartan at home.  Per RN, Ativan PRN ordered per Siobhan Ashley NP. Concern for alcohol withdrawal given patient's alcohol abuse history and current clinical presentation. Will start on CIWA protocol.     Informed by admitting team that Hospitalist Pradip MCCOY was asked to see this patient as a hospitalist consult. Discussed with Pradip MCCOY regarding patient. He stated that he was called earlier for suspected afib RVR with HR in the 130s and he advised primary team to consult cardiology. I informed him that I was asked to evaluate the patient at bedside and that I would be starting patient on CIWA protocol for alcohol withdrawal. He stated that he will follow the patient on 5/22. Hospitalist PA and patient's RN updated with my plan.    Reviewed EKGs, initial EKG showing sinus rhythm. Repeat EKG showing sinus tachycardia with . Believe that this is due to alcohol withdrawal. Advised RN that repeat EKG should be ordered after addressing tachycardia as I suspect alcohol withdrawal is driving patient's tachycardia. Also resumed patient's home paroxetine and valsartan.       Roberto Stephen MD  PGY-2 Internal Medicine Resident

## 2024-05-21 NOTE — PROGRESS NOTES
[facility]  OCCUPATIONAL THERAPY MISSED TREATMENT NOTE  STRZ ICU STEPDOWN TELEMETRY 4K  4K-16/016-A      Date: 2024  Patient Name: Lucius Day        CSN: [id number]   : 1955  (68 y.o.)  Gender: male                REASON FOR MISSED TREATMENT:  Per nursing patient up in room independently and completing BADL tasks independently. No formal OT evaluation completed. Please re-consult if status changes

## 2024-05-21 NOTE — PROGRESS NOTES
Pt admitted to  4K16 in a wheelchair.   Complaints: Chest pain / discomfort.   IV site free of s/s of infection or infiltration. Vital signs obtained. Assessment and data collection initiated. Two nurse skin assessment performed by Chiara MCRAE and Zo MCRAE. Oriented to room. Policies and procedures for 4 explained. All questions answered with no further questions at this time. Fall prevention and safety brochure discussed with patient.  Bed alarm on. Call light in reach.

## 2024-05-21 NOTE — ED PROVIDER NOTES
[facility]  EMERGENCY DEPARTMENT ENCOUNTER          Pt Name: Lucius Day  MRN: [id number]  Birthdate 1955  Date of evaluation: 5/20/2024    CHIEF COMPLAINT     No chief complaint on file.      Nurses Notes reviewed and I agree except as noted in the HPI.    HISTORY OF PRESENT ILLNESS    Lucius Day is a 68 y.o. pleasant male who presents to the emergency department for evaluation of, transfer from [facility].  Patient reports that 2 weeks ago he was in [place], was on 1 step above ground level when he fell, landed on cement on his right side.  Denies head trauma or loss of consciousness.  Since that time he has had pain on the right side of his chest and some shortness of breath.  Also has pain with inspiration.  Patient was seen at [facility] today and diagnosed with multiple rib fractures, pleural effusion and hemothorax on the right, also concern for liver laceration.  He was transferred to our facility for further evaluation.  He denies headache or neck pain.  Denies back pain.  Denies injury to his extremities.  No abdominal pain.  No other traumatic injury reported. Denies blood thinner use. Denies need for pain medication at this time.  The patient has no other acute complaints at this time.        REVIEW OF SYSTEMS   Review of Systems    PAST MEDICAL AND SURGICAL HISTORY     Past Medical History:   Diagnosis Date    Anxiety      Past Surgical History:   Procedure Laterality Date    HERNIA REPAIR  2-21-14    Lauren craft inguinal hernia repair     INGUINAL HERNIA REPAIR Right 01/06/2017    Repair of recurrent right inguinal hernia with mesh plug by Dr Shea    VASECTOMY  1980's early       CURRENT MEDICATIONS     Current Facility-Administered Medications:     sodium chloride 0.9 % bolus 250 mL, 250 mL, IntraVENous, Once, Gabbie Guerrero MD, Last Rate: 124 mL/hr at 05/20/24 2355, 250 mL at 05/20/24 2355    Current Outpatient  low as reasonably achievable.      XR INTERPRET OUTSIDE FILMS   Final Result   1. Large right pleural effusion.      This document has been electronically signed by: Jorge Dunn MD on    05/20/2024 10:50 PM      CT INTERPRETATION OF OUTSIDE IMAGES   Final Result   1. Acute right 7th through 11th rib fractures, mildly displaced and    comminuted.   2. Large right intermediate density pleural effusion consistent with    hemothorax. No findings of pneumothorax.   3. Ill-defined hypodense lesion in the posterior and lateral right hepatic    dome region suspicious for liver laceration.      This document has been electronically signed by: Jorge Dunn MD on    05/20/2024 11:02 PM      All CTs at this facility use dose modulation techniques and iterative    reconstructions, and/or weight-based dosing   when appropriate to reduce radiation to a low as reasonably achievable.        [] Visualized and interpreted by me   [x] Radiologist's Wet Read Report Reviewed   [] Discussed withRadiologist.    LABS:   Labs Reviewed   CBC WITH AUTO DIFFERENTIAL - Abnormal; Notable for the following components:       Result Value    WBC 24.5 (*)     RBC 3.62 (*)     Hemoglobin 13.2 (*)     Hematocrit 37.6 (*)     .9 (*)     MCH 36.5 (*)     RDW-SD 50.4 (*)     All other components within normal limits   BASIC METABOLIC PANEL - Abnormal; Notable for the following components:    Sodium 131 (*)     Chloride 95 (*)     Glucose 123 (*)     All other components within normal limits   OSMOLALITY - Abnormal; Notable for the following components:    Osmolality Calc 264.5 (*)     All other components within normal limits   ANION GAP   GLOMERULAR FILTRATION RATE, ESTIMATED   SCAN OF BLOOD SMEAR       (Any cultures that may have been sent were not resulted at the time of this patient visit)    ED Medications administered this visit:   Medications   sodium chloride 0.9 % bolus 250 mL (250 mLs IntraVENous New Bag 5/20/24 0323)   iopamidol

## 2024-05-21 NOTE — PROCEDURES
PROCEDURE NOTE  Date: 5/21/2024   Name: Lucius Day  YOB: 1955    Procedures  12 lead EKG completed. Results handed to Chiara MCRAE.

## 2024-05-21 NOTE — ED NOTES
Patient resting comfortably in cot, breathing even and unlabored. Patient denies any needs at this time. Call light within reach, and bedrail x2.  Patient given ice chips and repositioned in bed.

## 2024-05-21 NOTE — CARE COORDINATION
Case Management Assessment Initial Evaluation    Date/Time of Evaluation: 5/21/2024 10:29 AM  Assessment Completed by: Canelo Bermeo RN    If patient is discharged prior to next notation, then this note serves as note for discharge by case management.    Patient Name: Lucius Day                   YOB: 1955  Diagnosis: Hemothorax on right [J94.2]  Fall down steps, initial encounter [W10.8XXA]  Fall from standing, initial encounter [W19.XXXA]  Multiple fractures of ribs, right side, initial encounter for closed fracture [S22.41XA]                   Date / Time: 5/20/2024 10:19 PM  Location: [address] [place] [address]     Patient Admission Status: Inpatient   Readmission Risk Low 0-14, Mod 15-19), High > 20: Readmission Risk Score: 9.3    Current PCP: Dwayne Nava MD    Additional Case Management Notes:   From [facility]  Trauma/Fall (3w ago in [place]), Right Pleural Effusion, Right Hemothroax/Right Ribs Fractures/A-fib  History: Active Smoker, Alcohol Use  Na+ 130, elevated WBC  IVF  Heparin Gtt  Procedures:   5/21 Right PTC     Patient Goals/Plan/Treatment Preferences: lives w spouse Roberta, monitor therapy/rehab needs; Addiction Services following, still drives          05/21/24 1026   Service Assessment   Patient Orientation Alert and Oriented   Cognition Alert   History Provided By Patient;Medical Record   Primary Caregiver Self   Accompanied By/Relationship spouse   Support Systems Spouse/Significant Other   Patient's Healthcare Decision Maker is: Legal Next of Kin   PCP Verified by CM Yes   Last Visit to PCP Within last 3 months   Prior Functional Level Assistance with the following:;Mobility;Shopping;Housework;Cooking   Current Functional Level Assistance with the following:;Mobility;Shopping;Housework;Cooking   Can patient return to prior living arrangement Unknown at present   Ability to make needs known: Good   Family able to assist with home care needs: Yes   Would you like for me to discuss the

## 2024-05-21 NOTE — PROGRESS NOTES
course      SUBJECTIVE  Pt doing fair. He reports he continues to have a lot of chest wall pain but his breathing does feel improved. Pain is adequately controlled. He is tolerating a regular diet. Plan for PT/OT today. Pt is passing flatus but no bowel movement. Pt denies any nausea or vomiting.      Wt Readings from Last 3 Encounters:   05/21/24 83.9 kg (185 lb)   02/07/17 84.9 kg (187 lb 3.2 oz)   01/19/17 87.7 kg (193 lb 4.8 oz)     Temp Readings from Last 3 Encounters:   05/21/24 97.7 °F (36.5 °C) (Oral)   02/07/17 98.4 °F (36.9 °C) (Tympanic)   01/19/17 98.6 °F (37 °C) (Tympanic)     BP Readings from Last 3 Encounters:   05/21/24 113/70   02/07/17 118/70   01/19/17 134/72     Pulse Readings from Last 3 Encounters:   05/21/24 75   02/07/17 76   01/19/17 65       24 HR INTAKE/OUTPUT : No intake or output data in the 24 hours ending 05/21/24 1358  ADULT DIET; Regular    OBJECTIVE  CURRENT VITALS /70   Pulse 75   Temp 97.7 °F (36.5 °C) (Oral)   Resp 12   Ht 1.829 m (6')   Wt 83.9 kg (185 lb)   SpO2 97%   BMI 25.09 kg/m²   GENERAL: Awake, alert, no acute distress, pleasant and cooperative with exam  HEENT: Normocephalic, atraumatic, pupils equal and reactive to light, nares patent bilaterally  NEURO: Alert and orient x3, GCS 15, follows commands, PMS intact in all four extremities, no signs of focal neurological deficits  CSPINE/BACK: No midline cervical, thoracic, or lumbar tenderness to palpation  HEART: Regular rate and rhythm with no obvious murmurs, rubs, gallops.  Distal pulses intact.  LUNGS/CHEST WALL: Lungs are diminished but clear to auscultation bilaterally with no wheezes, rales, rhonchi.  No respiratory distress or increased work of breathing.  No chest wall tenderness to palpation.    ABDOMEN: Abdomen soft, nondistended, with no tenderness to palpation.  No guarding or peritoneal signs.  Bowel sounds normal active  EXTREMITIES: No cyanosis or edema.  PMS intact in all four extremities.   further evaluate for an underlying mass. Workstation ID:   [id number]     12 Minutes spent in patient care collectively between subjective/objective examination, chart review, documentation, clinical reasoning and discussion with attending regarding plan/interval changes.    Electronically signed by POLLO Kilgore CNP on 5/21/2024 at 1:58 PM      Patient seen and examined independently by me 5/21/2024  Total time personally spent on this patient encounter was 25 minutes which includes :  Preparing to see the patient( reviewing tests and chart)  Obtaining and reviewing separately obtained history  Performing a medically appropriate examination and evaluation  Ordering medications, tests, or procedures  Counseling and educating the patient/family/caregiver  Care coordination  Referring and communicating with other healthcare professionals  Documenting clinical information in the EHR  Independent interpretation of results and communicating the results to patient and care team  This includes a direct physical exam as well as all the other encounter activities described above.   Time may be discontiguous.   Time does not include procedures.    Please see our orders that were directed and approved by me if there are any new ones for the updated patient care plan.    Above discussed and I agree with documentation and orders placed by Siobhan Ashley CNP    See any additional comments if needed below for any other updated orders and plans.     -- Pain control.  Rib protocol.  Pulmonary toileting.  IR for percutaneous drain placement.  Trend labs.  DVT prophylaxis with SCDs.    Electronically signed by Jorge العراقي MD on 5/21/24 at 2:00 PM EDT

## 2024-05-21 NOTE — CONSULTS
Brief Intervention and Referral to Treatment Summary    Patient was provided PHQ-9, AUDIT-C and DAST Screening:      PHQ-9 Score: 0  AUDIT-C Score:  10  DAST Score:  1    Patient’s substance use is considered     Harmful      Patient’s depression is considered:     Minimal     Brief Education Was Provided    Patient was not receptive      Brief Intervention Is Provided (Only for AUDIT-C or DAST)       Patient denies readiness to decrease and/or stop use and a plan was not discussed      Injured Trauma Survivor Screening  1.  When you were injured or right afterward   Did you think you were going to die?  NO  Do you think this was done to you intentionally? NO    Since your injury  Have you felt more restless, tense or jumpy than usual? YES  +1  Have you found yourself unable to stop worrying?  NO  Do you find yourself thinking that the world is unsafe and that people are not to be trusted?: NO    TOTAL SCORE from ITSS Questions 1 and 2: 1  NOTE: A score of greater than or equal to 2 is considered positive for PTSD risk and is to receive a community resource packet to link with appropriate providers.    Recommendations/Referrals for Brief and/or Specialized Treatment Provided to Patient:   Patient reports that he was on vacation in [place] when he had too much to drink and fell. He reports that he drinks approximately 6 beers a day and has no interest in stopping his drinking habits. Denies wanting a resource packet from  at this time.

## 2024-05-21 NOTE — PROGRESS NOTES
Pt converted to [id number]. Chest pain on left side going to back. Output from chest tube >1100. MD informed. Orders received. Lopressor given and brought rate to 104. EKG complete and labs drawn. Morphine given for pain. Pt resting in bed at this time.

## 2024-05-21 NOTE — PROGRESS NOTES
Respiratory Care is following the rib fracture order set. Patient's position when testing was done was Fowlers.  A Negative Inspiratory Force (NIF) was performed with patient achieving a NIF of -34 cm H2O. The NIF was greater than 25 cm H2O. A Forced Vital Capacity (FVC) was obtained with patient achieving an FVC of 1.98 liters. The patient's calculated ideal body weight, (IBW) is  78 kg. 0.020 liters/kg of the patient's IBW is 1.56 liters. The patient's FVC was greater than 0.020 liters/kg of IBW. Based on the spirometry measurement alone, patient does not meet ICU admission criteria.

## 2024-05-21 NOTE — OP NOTE
Department of Radiology  Post Procedure Progress Note      Pre-Procedure Diagnosis:  Right hemothorax    Procedure Performed:  Chest tube placement using CT guidance    Anesthesia: local / versed and fentanyl    Findings: successful    Immediate Complications:  None    Estimated Blood Loss: minimal    SEE DICTATED PROCEDURE NOTE FOR COMPLETE DETAILS.    Electronically signed by Josué Carrion MD on 5/21/2024 at 3:24 PM

## 2024-05-21 NOTE — H&P
Response: Oriented  Best Motor Response: Obeys commands  [place] Coma Scale Score: 15  Results for orders placed or performed during the hospital encounter of 05/20/24   CBC with Auto Differential   Result Value Ref Range    WBC 24.5 (H) 4.8 - 10.8 thou/mm3    RBC 3.62 (L) 4.70 - 6.10 mill/mm3    Hemoglobin 13.2 (L) 14.0 - 18.0 gm/dl    Hematocrit 37.6 (L) 42.0 - 52.0 %    .9 (H) 80.0 - 94.0 fL    MCH 36.5 (H) 26.0 - 33.0 pg    MCHC 35.1 32.2 - 35.5 gm/dl    RDW-CV 13.2 11.5 - 14.5 %    RDW-SD 50.4 (H) 35.0 - 45.0 fL    Platelets 311 130 - 400 thou/mm3    Platelet Estimate ADEQUATE Adequate   BMP   Result Value Ref Range    Sodium 131 (L) 135 - 145 meq/L    Potassium 4.8 3.5 - 5.2 meq/L    Chloride 95 (L) 98 - 111 meq/L    CO2 23 23 - 33 meq/L    Glucose 123 (H) 70 - 108 mg/dL    BUN 14 7 - 22 mg/dL    Creatinine 0.7 0.4 - 1.2 mg/dL    Calcium 9.6 8.5 - 10.5 mg/dL   Anion Gap   Result Value Ref Range    Anion Gap 13.0 8.0 - 16.0 meq/L   Glomerular Filtration Rate, Estimated   Result Value Ref Range    Est, Glom Filt Rate > 90 >60 ml/min/1.73m2   Osmolality   Result Value Ref Range    Osmolality Calc 264.5 (L) 275.0 - 300.0 mOsmol/kg   Scan of Blood Smear   Result Value Ref Range    SCAN OF BLOOD SMEAR see below        Physical Exam:  Patient Vitals for the past 24 hrs:   BP Temp Temp src Pulse Resp SpO2   05/21/24 0049 (!) 146/96 -- -- 78 16 97 %   05/20/24 2356 (!) 170/86 -- -- 83 19 97 %   05/20/24 2225 -- -- -- -- -- 95 %   05/20/24 2224 (!) 153/115 99.4 °F (37.4 °C) Oral -- -- --   05/20/24 2223 -- -- -- 85 16 (!) 88 %     Primary Assessment:  Airway: Patent, trachea midline  Breathing: Breath sounds present and equal bilaterally, spontaneous, and unlabored  Circulation: Hemodynamically stable, 2+ central and peripheral pulses.  Disability: ROY x 4, following commands. GCS =15    Secondary Assessment:  General: Alert, NAD.  Head: Normocephalic, mid face stable. Tympanic membranes intact. Nares patent    Time may be discontiguous.   Time does not include procedures.    Please see our orders that were directed and approved by me if there are any new ones for the updated patient care plan.    Above discussed and I agree with documentation and orders placed by Danelle Rogel CNP    See any additional comments if needed below for any other updated orders and plans.     -- Admitting for pain control.  Interventional radiology consultation for percutaneous drain placement.  Pain control.  A.m. labs and chest x-ray.  Trend hemoglobin.  Pulmonary toileting.  Rib protocol.  SCD for DVT prophylaxis.  All questions answered.    Electronically signed by Jorge العراقي MD on 5/21/24 at 8:36 AM EDT

## 2024-05-21 NOTE — PLAN OF CARE
Problem: Discharge Planning  Goal: Discharge to home or other facility with appropriate resources  Flowsheets (Taken 5/21/2024 0356)  Discharge to home or other facility with appropriate resources:   Identify discharge learning needs (meds, wound care, etc)   Identify barriers to discharge with patient and caregiver   Refer to discharge planning if patient needs post-hospital services based on physician order or complex needs related to functional status, cognitive ability or social support system     Problem: Pain  Goal: Verbalizes/displays adequate comfort level or baseline comfort level  Flowsheets (Taken 5/21/2024 0356)  Verbalizes/displays adequate comfort level or baseline comfort level:   Encourage patient to monitor pain and request assistance   Administer analgesics based on type and severity of pain and evaluate response   Consider cultural and social influences on pain and pain management     Problem: Safety - Adult  Goal: Free from fall injury  Flowsheets (Taken 5/21/2024 0356)  Free From Fall Injury:   Instruct family/caregiver on patient safety   Based on caregiver fall risk screen, instruct family/caregiver to ask for assistance with transferring infant if caregiver noted to have fall risk factors     Problem: ABCDS Injury Assessment  Goal: Absence of physical injury  Flowsheets (Taken 5/21/2024 0356)  Absence of Physical Injury: Implement safety measures based on patient assessment

## 2024-05-21 NOTE — CONSULTS
Hepatic:   Recent Labs     24  1738   AST 13   ALT 8*   BILITOT 0.3   ALKPHOS 52     Troponin: No results for input(s): \"TROPONINI\" in the last 72 hours.  BNP: No results for input(s): \"BNP\" in the last 72 hours.  Lipids: No results for input(s): \"CHOL\", \"HDL\" in the last 72 hours.    Invalid input(s): \"LDLCALCU\"  ABGs: No results found for: \"PHART\", \"PO2ART\", \"HIK8GQU\"  INR: No results for input(s): \"INR\" in the last 72 hours.  -----------------------------------------------------------------  PA/lat CXR: 2024 small right apical pneumothorax, unchanged.  Stable right basilar consolidation and pleural fluid.  Lower right rib fractures partially visualized.  Left lung clear.  Heart size normal.  EK2024 atrial fibrillation heart rate 151    Assessment and Plan   Atrial Fibrillation with RVR: complaints of palpitations. No history of CAD or Atrial fib. CTH no acute hemorrhage. [id number]=1 . I did collaborate with Trauma, Dr. العراقي and OK to start Heparin gtt, Cardiology has been consulted and ECHO is pending  AUD: Daily use of Beer and Vodka. Patient denies any auditory or visual hallucinations. No history of withdrawal seizures. PAWSS=3. CIWA was initiated per primary service, will continue and monitor. MVI/Thiamine/Folic acid continue daily.  Multiple right sided rib fractures: Acute right 7-11 ribs mildly displaced and comminuted: Rib fracture protocol, multimodal pain management, aggressive pulmonary toileting  Right sided hemothorax: 24 s/p pigtail catheter. Chest Xray is pending  Anxiety: history  Essential HPTN: history, Diovan have been continued with parameters to hold.     Patient Active Problem List   Diagnosis Code    S/P bilateral inguinal hernia repair Z98.890, Z87.19    Unilateral recurrent inguinal hernia without obstruction or gangrene K40.91    Fall against object W18.00XA    Hemothorax on right J94.2    Closed fracture of multiple ribs of right side S22.41XA     Traumatic ecchymosis of multiple sites of lower extremity, initial encounter S80.10XA    Superficial bruising of chest wall, right, initial encounter S20.211A    Subcutaneous emphysema due to trauma, initial encounter ([facility]) T79.7XXA    Fall down steps, initial encounter W10.8XXA    Nicotine dependence with current use F17.200    Alcohol use disorder F10.90       Jessica Quintana, APRN - CNP, CNP

## 2024-05-21 NOTE — ED NOTES
ED to inpatient nurses report      Chief Complaint:  No chief complaint on file.    Present to ED from: home    MOA:     LOC: alert and orientated to name, place, date  Mobility: Requires assistance * 1  Oxygen Baseline: roomair    Current needs required: 2L NC     Code Status:   No Order      Mental Status:  Level of Consciousness: Alert (0)    Psych Assessment:        Vitals:  Patient Vitals for the past 24 hrs:   BP Temp Temp src Pulse Resp SpO2   05/21/24 0049 (!) 146/96 -- -- 78 16 97 %   05/20/24 2356 (!) 170/86 -- -- 83 19 97 %   05/20/24 2225 -- -- -- -- -- 95 %   05/20/24 2224 (!) 153/115 99.4 °F (37.4 °C) Oral -- -- --   05/20/24 2223 -- -- -- 85 16 (!) 88 %        LDAs:   Peripheral IV 05/20/24 Right Antecubital (Active)   Site Assessment Clean, dry & intact 05/20/24 2227   Line Status Brisk blood return 05/20/24 2227       Peripheral IV 05/20/24 Distal;Left;Anterior Forearm (Active)   Site Assessment Clean, dry & intact 05/20/24 2227   Line Status Brisk blood return 05/20/24 2227       Peripheral IV 05/20/24 Right;Anterior Hand (Active)   Site Assessment Clean, dry & intact 05/20/24 2227   Line Status Brisk blood return 05/20/24 2227       Ambulatory Status:  No data recorded    Diagnosis:  DISPOSITION Admitted 05/21/2024 01:10:32 AM   Final diagnoses:   Multiple fractures of ribs, right side, initial encounter for closed fracture   Hemothorax on right   Fall from standing, initial encounter        Consults:  IP CONSULT TO ADDICTION SERVICES     Pain Score:  Pain Assessment  Pain Assessment: None - Denies Pain    C-SSRS:   Risk of Suicide: No Risk    Sepsis Screening:  Sepsis Risk Score: 1.34    [place] Fall Risk:       Swallow Screening        Preferred Language:   English      ALLERGIES     Patient has no known allergies.    SURGICAL HISTORY       Past Surgical History:   Procedure Laterality Date    HERNIA REPAIR  2-21-14    Hixenbaugh  venancio inguinal hernia repair     INGUINAL HERNIA REPAIR Right

## 2024-05-22 ENCOUNTER — APPOINTMENT (OUTPATIENT)
Age: 69
End: 2024-05-22
Attending: NURSE PRACTITIONER
Payer: MEDICARE

## 2024-05-22 ENCOUNTER — APPOINTMENT (OUTPATIENT)
Dept: GENERAL RADIOLOGY | Age: 69
End: 2024-05-22
Payer: MEDICARE

## 2024-05-22 PROBLEM — I48.91 ATRIAL FIBRILLATION WITH RVR (HCC): Status: ACTIVE | Noted: 2024-05-22

## 2024-05-22 LAB
ANION GAP SERPL CALC-SCNC: 9 MEQ/L (ref 8–16)
BASOPHILS ABSOLUTE: 0.1 THOU/MM3 (ref 0–0.1)
BASOPHILS NFR BLD AUTO: 0.7 %
BUN SERPL-MCNC: 14 MG/DL (ref 7–22)
CALCIUM SERPL-MCNC: 8.2 MG/DL (ref 8.5–10.5)
CHLORIDE SERPL-SCNC: 95 MEQ/L (ref 98–111)
CO2 SERPL-SCNC: 23 MEQ/L (ref 23–33)
CREAT SERPL-MCNC: 0.5 MG/DL (ref 0.4–1.2)
DEPRECATED RDW RBC AUTO: 50.9 FL (ref 35–45)
ECHO AO ASC DIAM: 3.1 CM
ECHO AO ASCENDING AORTA INDEX: 1.49 CM/M2
ECHO AV CUSP MM: 2.1 CM
ECHO AV PEAK GRADIENT: 9 MMHG
ECHO AV PEAK VELOCITY: 1.5 M/S
ECHO AV VELOCITY RATIO: 0.67
ECHO BSA: 2.06 M2
ECHO EST RA PRESSURE: 3 MMHG
ECHO IVC PROX: 1.4 CM
ECHO LA AREA 2C: 19 CM2
ECHO LA AREA 4C: 18.6 CM2
ECHO LA DIAMETER INDEX: 1.73 CM/M2
ECHO LA DIAMETER: 3.6 CM
ECHO LA MAJOR AXIS: 5.4 CM
ECHO LA MINOR AXIS: 5.3 CM
ECHO LA VOL BP: 51 ML (ref 18–58)
ECHO LA VOL MOD A2C: 56 ML (ref 18–58)
ECHO LA VOL MOD A4C: 47 ML (ref 18–58)
ECHO LA VOL/BSA BIPLANE: 25 ML/M2 (ref 16–34)
ECHO LA VOLUME INDEX MOD A2C: 27 ML/M2 (ref 16–34)
ECHO LA VOLUME INDEX MOD A4C: 23 ML/M2 (ref 16–34)
ECHO LV E' LATERAL VELOCITY: 7 CM/S
ECHO LV E' SEPTAL VELOCITY: 8 CM/S
ECHO LV FRACTIONAL SHORTENING: 29 % (ref 28–44)
ECHO LV INTERNAL DIMENSION DIASTOLE INDEX: 2.02 CM/M2
ECHO LV INTERNAL DIMENSION DIASTOLIC: 4.2 CM (ref 4.2–5.9)
ECHO LV INTERNAL DIMENSION SYSTOLIC INDEX: 1.44 CM/M2
ECHO LV INTERNAL DIMENSION SYSTOLIC: 3 CM
ECHO LV ISOVOLUMETRIC RELAXATION TIME (IVRT): 85 MS
ECHO LV IVSD: 1.2 CM (ref 0.6–1)
ECHO LV MASS 2D: 189.2 G (ref 88–224)
ECHO LV MASS INDEX 2D: 91 G/M2 (ref 49–115)
ECHO LV POSTERIOR WALL DIASTOLIC: 1.3 CM (ref 0.6–1)
ECHO LV RELATIVE WALL THICKNESS RATIO: 0.62
ECHO LVOT PEAK GRADIENT: 4 MMHG
ECHO LVOT PEAK VELOCITY: 1 M/S
ECHO MV A VELOCITY: 0.68 M/S
ECHO MV E DECELERATION TIME (DT): 271 MS
ECHO MV E VELOCITY: 0.72 M/S
ECHO MV E/A RATIO: 1.06
ECHO MV E/E' LATERAL: 10.29
ECHO MV E/E' RATIO (AVERAGED): 9.64
ECHO MV E/E' SEPTAL: 9
ECHO PV MAX VELOCITY: 0.8 M/S
ECHO PV PEAK GRADIENT: 3 MMHG
ECHO RIGHT VENTRICULAR SYSTOLIC PRESSURE (RVSP): 25 MMHG
ECHO RV INTERNAL DIMENSION: 2.7 CM
ECHO RV TAPSE: 2 CM (ref 1.7–?)
ECHO TV E WAVE: 0.6 M/S
ECHO TV REGURGITANT MAX VELOCITY: 2.32 M/S
ECHO TV REGURGITANT PEAK GRADIENT: 22 MMHG
EKG Q-T INTERVAL: 298 MS
EKG Q-T INTERVAL: 316 MS
EKG QRS DURATION: 86 MS
EKG QRS DURATION: 86 MS
EKG QTC CALCULATION (BAZETT): 423 MS
EKG QTC CALCULATION (BAZETT): 500 MS
EKG R AXIS: 38 DEGREES
EKG R AXIS: 45 DEGREES
EKG T AXIS: 35 DEGREES
EKG T AXIS: 54 DEGREES
EKG VENTRICULAR RATE: 121 BPM
EKG VENTRICULAR RATE: 151 BPM
EOSINOPHIL NFR BLD AUTO: 2.8 %
EOSINOPHILS ABSOLUTE: 0.4 THOU/MM3 (ref 0–0.4)
ERYTHROCYTE [DISTWIDTH] IN BLOOD BY AUTOMATED COUNT: 13.2 % (ref 11.5–14.5)
GFR SERPL CREATININE-BSD FRML MDRD: > 90 ML/MIN/1.73M2
GLUCOSE SERPL-MCNC: 94 MG/DL (ref 70–108)
HCT VFR BLD AUTO: 37.7 % (ref 42–52)
HEPARIN UNFRACTIONATED: 0.14 U/ML (ref 0.3–0.7)
HEPARIN UNFRACTIONATED: 0.16 U/ML (ref 0.3–0.7)
HEPARIN UNFRACTIONATED: < 0.04 U/ML (ref 0.3–0.7)
HGB BLD-MCNC: 12.5 GM/DL (ref 14–18)
IMM GRANULOCYTES # BLD AUTO: 0.28 THOU/MM3 (ref 0–0.07)
IMM GRANULOCYTES NFR BLD AUTO: 1.9 %
LYMPHOCYTES ABSOLUTE: 1 THOU/MM3 (ref 1–4.8)
LYMPHOCYTES NFR BLD AUTO: 6.9 %
MAGNESIUM SERPL-MCNC: 2 MG/DL (ref 1.6–2.4)
MAGNESIUM SERPL-MCNC: 2.1 MG/DL (ref 1.6–2.4)
MCH RBC QN AUTO: 35.1 PG (ref 26–33)
MCHC RBC AUTO-ENTMCNC: 33.2 GM/DL (ref 32.2–35.5)
MCV RBC AUTO: 105.9 FL (ref 80–94)
MONOCYTES ABSOLUTE: 1.4 THOU/MM3 (ref 0.4–1.3)
MONOCYTES NFR BLD AUTO: 9.6 %
NEUTROPHILS ABSOLUTE: 11.5 THOU/MM3 (ref 1.8–7.7)
NEUTROPHILS NFR BLD AUTO: 78.1 %
NRBC BLD AUTO-RTO: 0 /100 WBC
ORIGINAL SAMPLE NUMBER: NORMAL
OSMOLALITY SERPL: NORMAL MOSMOL/KG (ref 275–295)
OSMOLALITY UR: NORMAL MOSMOL/KG (ref 250–750)
PLATELET # BLD AUTO: 247 THOU/MM3 (ref 130–400)
PMV BLD AUTO: 9.1 FL (ref 9.4–12.4)
POTASSIUM SERPL-SCNC: 4.1 MEQ/L (ref 3.5–5.2)
RBC # BLD AUTO: 3.56 MILL/MM3 (ref 4.7–6.1)
REFERENCE LOCATION: NORMAL
REFERENCE RANGE: NORMAL
SODIUM SERPL-SCNC: 127 MEQ/L (ref 135–145)
SODIUM UR-SCNC: 32 MEQ/L
TEST RESULTS WITH UNITS: 275
TEST RESULTS WITH UNITS: 700
TEST RESULTS WITH UNITS: NORMAL
TEST RESULTS WITH UNITS: NORMAL
TEST(S) BEING PERFORMED: NORMAL
WBC # BLD AUTO: 14.7 THOU/MM3 (ref 4.8–10.8)

## 2024-05-22 PROCEDURE — 83935 ASSAY OF URINE OSMOLALITY: CPT

## 2024-05-22 PROCEDURE — 6360000002 HC RX W HCPCS: Performed by: NURSE PRACTITIONER

## 2024-05-22 PROCEDURE — 6370000000 HC RX 637 (ALT 250 FOR IP): Performed by: NURSE PRACTITIONER

## 2024-05-22 PROCEDURE — 1200000003 HC TELEMETRY R&B

## 2024-05-22 PROCEDURE — 83735 ASSAY OF MAGNESIUM: CPT

## 2024-05-22 PROCEDURE — 99231 SBSQ HOSP IP/OBS SF/LOW 25: CPT | Performed by: SURGERY

## 2024-05-22 PROCEDURE — 80048 BASIC METABOLIC PNL TOTAL CA: CPT

## 2024-05-22 PROCEDURE — 93010 ELECTROCARDIOGRAM REPORT: CPT | Performed by: INTERNAL MEDICINE

## 2024-05-22 PROCEDURE — 2500000003 HC RX 250 WO HCPCS: Performed by: NURSE PRACTITIONER

## 2024-05-22 PROCEDURE — 93306 TTE W/DOPPLER COMPLETE: CPT

## 2024-05-22 PROCEDURE — 85520 HEPARIN ASSAY: CPT

## 2024-05-22 PROCEDURE — 99223 1ST HOSP IP/OBS HIGH 75: CPT | Performed by: INTERNAL MEDICINE

## 2024-05-22 PROCEDURE — 85025 COMPLETE CBC W/AUTO DIFF WBC: CPT

## 2024-05-22 PROCEDURE — 99232 SBSQ HOSP IP/OBS MODERATE 35: CPT | Performed by: PHYSICIAN ASSISTANT

## 2024-05-22 PROCEDURE — 84300 ASSAY OF URINE SODIUM: CPT

## 2024-05-22 PROCEDURE — 71045 X-RAY EXAM CHEST 1 VIEW: CPT

## 2024-05-22 PROCEDURE — 94799 UNLISTED PULMONARY SVC/PX: CPT

## 2024-05-22 PROCEDURE — 94010 BREATHING CAPACITY TEST: CPT

## 2024-05-22 PROCEDURE — 2580000003 HC RX 258

## 2024-05-22 PROCEDURE — 6370000000 HC RX 637 (ALT 250 FOR IP)

## 2024-05-22 PROCEDURE — 1200000000 HC SEMI PRIVATE

## 2024-05-22 PROCEDURE — 2580000003 HC RX 258: Performed by: NURSE PRACTITIONER

## 2024-05-22 PROCEDURE — 83930 ASSAY OF BLOOD OSMOLALITY: CPT

## 2024-05-22 PROCEDURE — 36415 COLL VENOUS BLD VENIPUNCTURE: CPT

## 2024-05-22 RX ADMIN — METHOCARBAMOL 1000 MG: 500 TABLET ORAL at 20:50

## 2024-05-22 RX ADMIN — POLYETHYLENE GLYCOL 3350 17 G: 17 POWDER, FOR SOLUTION ORAL at 09:45

## 2024-05-22 RX ADMIN — Medication 100 MG: at 09:47

## 2024-05-22 RX ADMIN — FAMOTIDINE 20 MG: 20 TABLET, FILM COATED ORAL at 20:50

## 2024-05-22 RX ADMIN — METHOCARBAMOL 1000 MG: 500 TABLET ORAL at 12:43

## 2024-05-22 RX ADMIN — HYDROCODONE BITARTRATE AND ACETAMINOPHEN 2 TABLET: 5; 325 TABLET ORAL at 20:50

## 2024-05-22 RX ADMIN — VALSARTAN 320 MG: 320 TABLET, FILM COATED ORAL at 09:47

## 2024-05-22 RX ADMIN — METHOCARBAMOL 1000 MG: 500 TABLET ORAL at 09:47

## 2024-05-22 RX ADMIN — PAROXETINE HYDROCHLORIDE 20 MG: 20 TABLET, FILM COATED ORAL at 09:47

## 2024-05-22 RX ADMIN — DILTIAZEM HYDROCHLORIDE 7.5 MG/HR: 5 INJECTION, SOLUTION INTRAVENOUS at 12:43

## 2024-05-22 RX ADMIN — HEPARIN SODIUM AND DEXTROSE 17 UNITS/KG/HR: 10000; 5 INJECTION INTRAVENOUS at 19:23

## 2024-05-22 RX ADMIN — HEPARIN SODIUM 2000 UNITS: 1000 INJECTION INTRAVENOUS; SUBCUTANEOUS at 20:55

## 2024-05-22 RX ADMIN — HEPARIN SODIUM 2000 UNITS: 1000 INJECTION INTRAVENOUS; SUBCUTANEOUS at 14:12

## 2024-05-22 RX ADMIN — FAMOTIDINE 20 MG: 20 TABLET, FILM COATED ORAL at 09:45

## 2024-05-22 RX ADMIN — SODIUM CHLORIDE, PRESERVATIVE FREE 10 ML: 5 INJECTION INTRAVENOUS at 09:47

## 2024-05-22 RX ADMIN — FOLIC ACID 1 MG: 1 TABLET ORAL at 09:47

## 2024-05-22 RX ADMIN — Medication 1 TABLET: at 09:46

## 2024-05-22 RX ADMIN — METHOCARBAMOL 1000 MG: 500 TABLET ORAL at 16:55

## 2024-05-22 RX ADMIN — HEPARIN SODIUM 4000 UNITS: 1000 INJECTION INTRAVENOUS; SUBCUTANEOUS at 07:05

## 2024-05-22 ASSESSMENT — PAIN SCALES - GENERAL
PAINLEVEL_OUTOF10: 0
PAINLEVEL_OUTOF10: 0
PAINLEVEL_OUTOF10: 7
PAINLEVEL_OUTOF10: 0

## 2024-05-22 ASSESSMENT — PAIN SCALES - WONG BAKER: WONGBAKER_NUMERICALRESPONSE: NO HURT

## 2024-05-22 ASSESSMENT — PAIN DESCRIPTION - ORIENTATION: ORIENTATION: RIGHT

## 2024-05-22 ASSESSMENT — PAIN DESCRIPTION - LOCATION: LOCATION: CHEST

## 2024-05-22 NOTE — PROGRESS NOTES
[facility]  PHYSICAL THERAPY MISSED TREATMENT NOTE  STRZ ICU STEPDOWN TELEMETRY 4K    Date: 2024  Patient Name: Lucius Day        MRN: [id number]   : 1955  (68 y.o.)  Gender: male                REASON FOR MISSED TREATMENT:  Missed Treat.  Pt declined PT this AM due to wanting to sleep, declined again this PM.

## 2024-05-22 NOTE — CONSULTS
cranial nerves 2-12 grossly intact, motor and sensory intact, moving all extremities  Skin: No rashes    Med, labs, EKG , echo and imaging studies reviewed      Assessment/Plan:  New onset A-fib RVR.  Unknown chronicity.  Asymptomatic.  In setting of new trauma.  Remains in A-fib RVR a.m. of 5/22/2024.  [id number] score 1.  Echo pending, further recommendation pending results and clinical course.  Cardizem vs Toprol XL at discharge pending Echo results, will require transition to OAC prior to discharge.   Follow up with cardiology 2 weeks after discharge.   Dyspnea, resolving.  In setting of rib fractures and tobacco use.  Required 2-3 l via nasal cannula, maintaining saturations in the mid to high 90s. On room air maintaining O2 sats at time of exam. Chest tube in place, draining sanguinous fluid.  Managed per primary service.  Fall, with trauma.  Resulting in multiple right-sided rib fractures, right-sided hemothorax, subcutaneous emphysema.  Managed per trauma service.  HTN.  On valsartan 320 mg daily outpatient.   Continue valsartan.  Other conditions managed per primary and medicine services: Alcohol and tobacco use disorders, anxiety, hyponatremia, hypocalcemia, leukocytosis, macrocytic anemia.          Thank you for allowing us to participate in the care of this patient.  Please do not hesitate to call us with questions.    Electronically signed by Marilee Gilbert MD on 5/22/2024 at 8:13 AM    Case discussed with attending, Dr. Cherry Villareal.      I have seen and examined the patient independently. . Face to face evaluation and examination performed.   The above evaluation and note has been reviewed and Changes made as necessary. Labs, EKG, echo, and radiographs reviewed.   I Have discussed with Marilee Gilbert MD about this patient in detail   D/w Patient's R.N. and specific instructions given and cardiovascular disease issues addressed.   I above assessment and plan has been reviewed  and modified per my assessment as necessary and I agree with it.    S/p mechanical Fall, with trauma.  Resulting in multiple right-sided rib fractures, right-sided hemothorax with chest tube now, subcutaneous emphysema.  Managed per trauma service.  HTN  New onset A-fib RVR.  - noted in hospital after admission and going in and out of it. And now NSR  Chadsvasc 2    Plan:   Continue with the care  for the Pneumothorax and rib fracture  Rate control  start cardizem cd 120  Need OAC and pat verbalized understanding the risk of bleeding and agreed to proceed with OAC  Will follow adjust med for rate control  No need for BHAVIN-CV as pat get in and out frequent in one day  Echo    D/w the pat the plan of care  Based on the course and result we will gauge further care   Will follow    Thank you for allowing me to participate in the care of your patient. Please don't hesitate to contact me regarding any further issues related to the patient care    Cherry Villareal MD, MD,[facility]  Cardiology - [facility]

## 2024-05-22 NOTE — PROGRESS NOTES
@0434: Patient am labs drawn  @0545: This RN called lab to inquire about pending Anti XA. This RN spoke with Marta and per Marta \" we have the sample we are running a bunch of blue top tubes right now\"  @0630: This RN called lab and inquired about pending Anti XA this RN spoke with Marta again and tania Lozano \" we had to run the QC for the machine, it should be ready shortly\"

## 2024-05-22 NOTE — CARE COORDINATION
5/22/24, 9:37 AM EDT    DISCHARGE PLANNING EVALUATION    Received Social Work consult “For consideration of Rehab”.  Addiction/EMIL  consulted.   met with patient, following for needs.  Full Social Consult deferred.

## 2024-05-22 NOTE — PROGRESS NOTES
Respiratory Care is following the rib fracture order set. Patient's position when testing was done was Semi-Fowlers.  A Negative Inspiratory Force (NIF) was performed with patient achieving a NIF of -28 cm H2O. The NIF was greater than 25 cm H2O. A Forced Vital Capacity (FVC) was obtained with patient achieving an FVC of 1.42 liters. The patient's calculated ideal body weight, (IBW) is  78 kg. 0.020 liters/kg of the patient's IBW is 1.56 liters. The patient's FVC was less than 0.020 liters/kg of IBW. Based on the spirometry measurement alone, patient does not meet ICU admission criteria. Previous FVC was 1.98 liters and previous NIF was -34 cm H2O.  Patient's NIF and FVC are worsening from previous screening(s). Last pain medication was given on  5/21 and the patient was given multiple pain medications throughout the day.  Physician was not called regarding spirometry measurement.

## 2024-05-22 NOTE — PROGRESS NOTES
Respiratory Care is following the rib fracture order set. Patient's position when testing was done was semi fowlers.  A Negative Inspiratory Force (NIF) was performed with patient achieving a NIF of -35 cm H2O. The NIF was greater than 25 cm H2O. A Forced Vital Capacity (FVC) was obtained with patient achieving an FVC of 2.39 liters. The patient's calculated ideal body weight, (IBW) is  78 kg. 0.020 liters/kg of the patient's IBW is 1.56 liters. The patient's FVC was greater than 0.020 liters/kg of IBW. Based on the spirometry measurement alone, patient does not meet ICU admission criteria. Previous FVC was 1.42 liters and previous NIF was -28 cm H2O.  Patient's NIF and FVC are improving from previous screening(s). Last pain medication was given on  05/21/2024 @ 1232. Physician was not called regarding spirometry measurement.

## 2024-05-22 NOTE — PROCEDURES
PROCEDURE NOTE  Date: 5/21/2024   Name: Lucius Day  YOB: 1955    Procedures  12 lead EKG completed. Results handed to Grupo Samaniego CNP.

## 2024-05-22 NOTE — PROGRESS NOTES
Hospitalist Progress Note    Patient:  Lucius Day      Unit/Bed:4K-16/016-A    YOB: 1955    MRN: [id number]       Acct: [id number]     PCP: Dwayne Nava MD    Date of Admission: 5/20/2024    Assessment/Plan:    New Onset Atrial Fibrillation with RVR: Possibly 2/2 multiple right sided rib fx's, hemothorax and chest tube placement, alcohol withdrawal, or some combination of the above  [id number]   Cardizem gtt initiated for rate control  Cardiology consulted  Started on heparin gtt overnight, monitor H&H and output closely with hemothorax s/p chest tube placement  Echo ordered    Alcohol Use Disorder: Daily use of Beer and Vodka reported. No history of withdrawal seizures.  CIWA was initiated per primary service, continue and monitor. MVI/Thiamine/Folic acid continue daily.    Multiple acute right sided rib fractures 7-11 with associated right sided hemothorax:  s/p pigtail catheter placement 5/21/24 by IR  Trauma service primary  Rib fracture protocol  Pulmonary hygiene   Chest tube management per primary    Anxiety: history    Essential HTN: History, Diovan have been continued with parameters to hold.      LDA: []CVC / []PICC / []Midline / []Harrison / []Drains / []Mediport / [x]None  Antibiotics: No  Steroids: No  Labs (still needed?): [x]Yes / []No  IVF (still needed?): [x]Yes / []No    Level of care: [x]Step Down / []Med-Surg  Bed Status: [x]Inpatient / []Observation  Telemetry: []Yes / []No  PT/OT: []Yes / []No    DVT Prophylaxis: [] Lovenox / [x] Heparin / [] SCDs / [] Already on Systemic Anticoagulation / [] None     Disposition:    [x] Home       [] TCU       [] Rehab       [] Psych       [] SNF       [] Long Term Care Facility       [] Other-    Chief Complaint: Chest wall pain      Subjective: 68 y.o. male admitted to the trauma service for multiple right sided rib fractures. Patient rates his pain a 5-6/10. Patient denies nausea or vomiting. He denies  37.7*    275  --  247     Recent Labs     05/21/24  2017 05/21/24  2237 05/22/24  0434   * 128* 127*   K 3.9 4.9 4.1    96* 95*   CO2 22* 22* 23   BUN 13 15 14   CREATININE 0.5 0.7 0.5   CALCIUM 7.5* 8.3* 8.2*   PHOS 2.5  --   --      Recent Labs     05/21/24  1738   AST 13   ALT 8*   BILIDIR <0.2   BILITOT 0.3   ALKPHOS 52     Recent Labs     05/21/24 2021   INR 0.99     No results for input(s): \"CKTOTAL\", \"TROPONINI\" in the last 72 hours.    Urinalysis:    No results found for: \"NITRU\", \"WBCUA\", \"BACTERIA\", \"RBCUA\", \"BLOODU\", \"SPECGRAV\", \"GLUCOSEU\"    Radiology:  XR CHEST PORTABLE   Final Result   Impression:      Stable small right apical pneumothorax      Stable right basilar consolidation and pleural fluid      This document has been electronically signed by: Chris Dhaliwal MD on    05/22/2024 03:06 AM      CT HEAD WO CONTRAST   Final Result   No acute intracranial findings.      This document has been electronically signed by: Daron Cagle MD on    05/21/2024 09:24 PM      All CTs at this facility use dose modulation techniques and iterative    reconstructions, and/or weight-based dosing   when appropriate to reduce radiation to a low as reasonably achievable.      XR CHEST PORTABLE   Final Result   1. Previous large right pleural effusion is decreased, now small.   2. Residual hazy atelectasis or airspace disease in the right mid and    lower lungs.      This document has been electronically signed by: Daron Cagle MD on    05/21/2024 09:03 PM      CT GUIDED PLEURAL DRAINAGE W CATH PERC   Final Result   Successful, uncomplicated chest tube placement.            **This report has been created using voice recognition software.  It may contain   minor errors which are inherent in voice recognition technology.**               XR CHEST PORTABLE   Final Result   Large right-sided pleural effusion with collapse of the right middle   and right lower lobes. Acute right-sided rib fractures. Macro

## 2024-05-22 NOTE — SIGNIFICANT EVENT
Notified early this evening about patient with new onset Afib with RVR.     HR 120s. EKG, troponin, and Lopressor PRN HR >/= 110.     Hospitalist and cardiology consults placed.     Electrolytes rechecked:    Na 131  K 3.9  Mag 1.8  Phosphorus 2.5     No significant electrolyte imbalance. Troponin elevated but flat and trending downward. Question if related to chest tube placement this afternoon for hemopneumothorax? STAT H/H check is stable (12.8/38.4).     Discussed with hospitalist team; patient has a LURDES-2 score of 2 and Lovenox or heparin gtt recommended. Okay to start but will monitor H/H given chest tube output today.     Siobhan Ashley, APRN - CNP

## 2024-05-22 NOTE — CONSULTS
Per Social Work Note yesterday;   \"Brief Intervention and Referral to Treatment Summary     Patient was provided PHQ-9, AUDIT-C and DAST Screening:       PHQ-9 Score: 0  AUDIT-C Score:  10  DAST Score:  1     Patient’s substance use is considered      Harmful        Patient’s depression is considered:      Minimal      Brief Education Was Provided     Patient was not receptive        Brief Intervention Is Provided (Only for AUDIT-C or DAST)         Patient denies readiness to decrease and/or stop use and a plan was not discussed        Injured Trauma Survivor Screening  1.  When you were injured or right afterward   Did you think you were going to die?  NO  Do you think this was done to you intentionally? NO     Since your injury  Have you felt more restless, tense or jumpy than usual? YES  +1  Have you found yourself unable to stop worrying?  NO  Do you find yourself thinking that the world is unsafe and that people are not to be trusted?: NO     TOTAL SCORE from ITSS Questions 1 and 2: 1  NOTE: A score of greater than or equal to 2 is considered positive for PTSD risk and is to receive a community resource packet to link with appropriate providers.     Recommendations/Referrals for Brief and/or Specialized Treatment Provided to Patient:   Patient reports that he was on vacation in [place] when he had too much to drink and fell. He reports that he drinks approximately 6 beers a day and has no interest in stopping his drinking habits. Denies wanting a resource packet from  at this time.\"

## 2024-05-22 NOTE — PROGRESS NOTES
[facility]  Trauma Surgery - Dr. Jorge العراقي   Daily Progress Note  Pt Name: Lucius Day  Medical Record Number: [id number]  Date of Birth 1955   Today's Date: 5/22/2024    HD: # 1    CC: Chest wall pain     ASSESSMENT  1.  Active Hospital Problems    Diagnosis Date Noted    Traumatic ecchymosis of multiple sites of lower extremity, initial encounter [S80.10XA] 05/21/2024    Superficial bruising of chest wall, right, initial encounter [S20.211A] 05/21/2024    Subcutaneous emphysema due to trauma, initial encounter ([facility]) [T79.7XXA] 05/21/2024    Fall down steps, initial encounter [W10.8XXA] 05/21/2024    Nicotine dependence with current use [F17.200] 05/21/2024    Alcohol use disorder [F10.90] 05/21/2024    Fall against object [W18.00XA] 05/20/2024    Hemothorax on right [J94.2] 05/20/2024    Closed fracture of multiple ribs of right side [S22.41XA] 05/20/2024       PROCEDURES  05/21/2024: Right chest tube placement using CT guidance by Interventional Radiology       PLAN  Admitted to Trauma Services     Trauma by remote fall              - Fall precautions              - PT, OT continue to treat     Multiple right sided rib fractures              - CT interpretation of outside images notes acute right 7th through 11th rib fractures, mildly displaced and comminuted              - Rib fracture protocol              - Lidoderm patches              - IS, C&DB              - Pain control              - CXR in AM              - Aggressive pulmonary toileting              - Wean oxygen as able   - 05/21: CXR this AM notes large right-sided pleural effusion with collapse of the right middle and right lower lobes.  Acute right-sided rib fractures.  Still requiring O2 this morning, remains 98% on 2L O2 via n/c. Pain control with Lidoderm patch x 3, Robaxin 1000 mg PO QID, Norco 1-2 tabs q 4 hrs PRN, Morphine 2-4 mg IV q 2 hrs PRN. Plan for IR drain today for right hemothorax.    - 05/22: Chest tube

## 2024-05-23 ENCOUNTER — APPOINTMENT (OUTPATIENT)
Dept: GENERAL RADIOLOGY | Age: 69
End: 2024-05-23
Payer: MEDICARE

## 2024-05-23 LAB
ANION GAP SERPL CALC-SCNC: 7 MEQ/L (ref 8–16)
BASOPHILS ABSOLUTE: 0.1 THOU/MM3 (ref 0–0.1)
BASOPHILS NFR BLD AUTO: 1 %
BUN SERPL-MCNC: 10 MG/DL (ref 7–22)
CALCIUM SERPL-MCNC: 8.3 MG/DL (ref 8.5–10.5)
CHLORIDE SERPL-SCNC: 101 MEQ/L (ref 98–111)
CO2 SERPL-SCNC: 25 MEQ/L (ref 23–33)
CREAT SERPL-MCNC: 0.6 MG/DL (ref 0.4–1.2)
DEPRECATED RDW RBC AUTO: 51 FL (ref 35–45)
EOSINOPHIL NFR BLD AUTO: 4 %
EOSINOPHILS ABSOLUTE: 0.3 THOU/MM3 (ref 0–0.4)
ERYTHROCYTE [DISTWIDTH] IN BLOOD BY AUTOMATED COUNT: 13.1 % (ref 11.5–14.5)
GFR SERPL CREATININE-BSD FRML MDRD: > 90 ML/MIN/1.73M2
GLUCOSE SERPL-MCNC: 105 MG/DL (ref 70–108)
HCT VFR BLD AUTO: 35.7 % (ref 42–52)
HEPARIN UNFRACTIONATED: 0.23 U/ML (ref 0.3–0.7)
HEPARIN UNFRACTIONATED: 0.32 U/ML (ref 0.3–0.7)
HGB BLD-MCNC: 11.8 GM/DL (ref 14–18)
IMM GRANULOCYTES # BLD AUTO: 0.21 THOU/MM3 (ref 0–0.07)
IMM GRANULOCYTES NFR BLD AUTO: 2.4 %
LYMPHOCYTES ABSOLUTE: 1 THOU/MM3 (ref 1–4.8)
LYMPHOCYTES NFR BLD AUTO: 11.2 %
MCH RBC QN AUTO: 35 PG (ref 26–33)
MCHC RBC AUTO-ENTMCNC: 33.1 GM/DL (ref 32.2–35.5)
MCV RBC AUTO: 105.9 FL (ref 80–94)
MONOCYTES ABSOLUTE: 1.1 THOU/MM3 (ref 0.4–1.3)
MONOCYTES NFR BLD AUTO: 12.6 %
NEUTROPHILS ABSOLUTE: 5.9 THOU/MM3 (ref 1.8–7.7)
NEUTROPHILS NFR BLD AUTO: 68.8 %
NRBC BLD AUTO-RTO: 0 /100 WBC
PLATELET # BLD AUTO: 244 THOU/MM3 (ref 130–400)
PMV BLD AUTO: 8.9 FL (ref 9.4–12.4)
POTASSIUM SERPL-SCNC: 4.2 MEQ/L (ref 3.5–5.2)
RBC # BLD AUTO: 3.37 MILL/MM3 (ref 4.7–6.1)
SODIUM SERPL-SCNC: 133 MEQ/L (ref 135–145)
WBC # BLD AUTO: 8.6 THOU/MM3 (ref 4.8–10.8)

## 2024-05-23 PROCEDURE — 6370000000 HC RX 637 (ALT 250 FOR IP): Performed by: PHYSICIAN ASSISTANT

## 2024-05-23 PROCEDURE — 85025 COMPLETE CBC W/AUTO DIFF WBC: CPT

## 2024-05-23 PROCEDURE — 1200000000 HC SEMI PRIVATE

## 2024-05-23 PROCEDURE — 6370000000 HC RX 637 (ALT 250 FOR IP): Performed by: NURSE PRACTITIONER

## 2024-05-23 PROCEDURE — 99231 SBSQ HOSP IP/OBS SF/LOW 25: CPT | Performed by: SURGERY

## 2024-05-23 PROCEDURE — 36415 COLL VENOUS BLD VENIPUNCTURE: CPT

## 2024-05-23 PROCEDURE — 85520 HEPARIN ASSAY: CPT

## 2024-05-23 PROCEDURE — 94799 UNLISTED PULMONARY SVC/PX: CPT

## 2024-05-23 PROCEDURE — 94010 BREATHING CAPACITY TEST: CPT

## 2024-05-23 PROCEDURE — 97116 GAIT TRAINING THERAPY: CPT

## 2024-05-23 PROCEDURE — 80048 BASIC METABOLIC PNL TOTAL CA: CPT

## 2024-05-23 PROCEDURE — 97162 PT EVAL MOD COMPLEX 30 MIN: CPT

## 2024-05-23 PROCEDURE — 6360000002 HC RX W HCPCS: Performed by: NURSE PRACTITIONER

## 2024-05-23 PROCEDURE — 6370000000 HC RX 637 (ALT 250 FOR IP)

## 2024-05-23 PROCEDURE — 99233 SBSQ HOSP IP/OBS HIGH 50: CPT | Performed by: PHYSICIAN ASSISTANT

## 2024-05-23 PROCEDURE — 2580000003 HC RX 258

## 2024-05-23 PROCEDURE — 71045 X-RAY EXAM CHEST 1 VIEW: CPT

## 2024-05-23 RX ORDER — VALSARTAN 320 MG/1
160 TABLET ORAL DAILY
Status: DISCONTINUED | OUTPATIENT
Start: 2024-05-24 | End: 2024-05-25 | Stop reason: HOSPADM

## 2024-05-23 RX ORDER — LANOLIN ALCOHOL/MO/W.PET/CERES
1.5 CREAM (GRAM) TOPICAL NIGHTLY PRN
Status: DISCONTINUED | OUTPATIENT
Start: 2024-05-23 | End: 2024-05-25 | Stop reason: HOSPADM

## 2024-05-23 RX ORDER — DILTIAZEM HYDROCHLORIDE 120 MG/1
120 CAPSULE, COATED, EXTENDED RELEASE ORAL DAILY
Status: DISCONTINUED | OUTPATIENT
Start: 2024-05-23 | End: 2024-05-25 | Stop reason: HOSPADM

## 2024-05-23 RX ADMIN — APIXABAN 5 MG: 5 TABLET, FILM COATED ORAL at 13:38

## 2024-05-23 RX ADMIN — FOLIC ACID 1 MG: 1 TABLET ORAL at 08:12

## 2024-05-23 RX ADMIN — Medication 100 MG: at 08:12

## 2024-05-23 RX ADMIN — PAROXETINE HYDROCHLORIDE 20 MG: 20 TABLET, FILM COATED ORAL at 08:11

## 2024-05-23 RX ADMIN — Medication 1 TABLET: at 08:12

## 2024-05-23 RX ADMIN — HYDROCODONE BITARTRATE AND ACETAMINOPHEN 1 TABLET: 5; 325 TABLET ORAL at 12:25

## 2024-05-23 RX ADMIN — APIXABAN 5 MG: 5 TABLET, FILM COATED ORAL at 21:33

## 2024-05-23 RX ADMIN — METHOCARBAMOL 1000 MG: 500 TABLET ORAL at 13:37

## 2024-05-23 RX ADMIN — HYDROCODONE BITARTRATE AND ACETAMINOPHEN 2 TABLET: 5; 325 TABLET ORAL at 21:34

## 2024-05-23 RX ADMIN — HEPARIN SODIUM 2000 UNITS: 1000 INJECTION INTRAVENOUS; SUBCUTANEOUS at 07:50

## 2024-05-23 RX ADMIN — VALSARTAN 320 MG: 320 TABLET, FILM COATED ORAL at 08:12

## 2024-05-23 RX ADMIN — METHOCARBAMOL 1000 MG: 500 TABLET ORAL at 08:12

## 2024-05-23 RX ADMIN — HEPARIN SODIUM AND DEXTROSE 21 UNITS/KG/HR: 10000; 5 INJECTION INTRAVENOUS at 11:03

## 2024-05-23 RX ADMIN — METHOCARBAMOL 1000 MG: 500 TABLET ORAL at 19:01

## 2024-05-23 RX ADMIN — METHOCARBAMOL 1000 MG: 500 TABLET ORAL at 22:58

## 2024-05-23 RX ADMIN — FAMOTIDINE 20 MG: 20 TABLET, FILM COATED ORAL at 08:11

## 2024-05-23 RX ADMIN — DILTIAZEM HYDROCHLORIDE 120 MG: 120 CAPSULE, EXTENDED RELEASE ORAL at 13:38

## 2024-05-23 RX ADMIN — FAMOTIDINE 20 MG: 20 TABLET, FILM COATED ORAL at 21:33

## 2024-05-23 RX ADMIN — SODIUM CHLORIDE, PRESERVATIVE FREE 10 ML: 5 INJECTION INTRAVENOUS at 08:12

## 2024-05-23 RX ADMIN — Medication 1.5 MG: at 22:26

## 2024-05-23 RX ADMIN — SODIUM CHLORIDE, PRESERVATIVE FREE 10 ML: 5 INJECTION INTRAVENOUS at 21:33

## 2024-05-23 ASSESSMENT — PAIN SCALES - GENERAL
PAINLEVEL_OUTOF10: 5
PAINLEVEL_OUTOF10: 0
PAINLEVEL_OUTOF10: 5
PAINLEVEL_OUTOF10: 5
PAINLEVEL_OUTOF10: 7
PAINLEVEL_OUTOF10: 0

## 2024-05-23 ASSESSMENT — PAIN DESCRIPTION - FREQUENCY: FREQUENCY: INTERMITTENT

## 2024-05-23 ASSESSMENT — PAIN DESCRIPTION - ORIENTATION
ORIENTATION: RIGHT

## 2024-05-23 ASSESSMENT — PAIN DESCRIPTION - LOCATION
LOCATION: CHEST
LOCATION: CHEST
LOCATION: ABDOMEN
LOCATION: CHEST

## 2024-05-23 ASSESSMENT — PAIN SCALES - WONG BAKER
WONGBAKER_NUMERICALRESPONSE: NO HURT

## 2024-05-23 ASSESSMENT — PAIN DESCRIPTION - PAIN TYPE
TYPE: ACUTE PAIN
TYPE: ACUTE PAIN

## 2024-05-23 ASSESSMENT — PAIN DESCRIPTION - DESCRIPTORS
DESCRIPTORS: ACHING;DISCOMFORT
DESCRIPTORS: ACHING;STABBING
DESCRIPTORS: THROBBING;ACHING

## 2024-05-23 ASSESSMENT — PAIN - FUNCTIONAL ASSESSMENT
PAIN_FUNCTIONAL_ASSESSMENT: PREVENTS OR INTERFERES SOME ACTIVE ACTIVITIES AND ADLS
PAIN_FUNCTIONAL_ASSESSMENT: ACTIVITIES ARE NOT PREVENTED

## 2024-05-23 NOTE — PLAN OF CARE
Problem: Discharge Planning  Goal: Discharge to home or other facility with appropriate resources  Outcome: Progressing  Flowsheets (Taken 5/22/2024 2246)  Discharge to home or other facility with appropriate resources:   Identify barriers to discharge with patient and caregiver   Arrange for needed discharge resources and transportation as appropriate   Identify discharge learning needs (meds, wound care, etc)     Problem: Pain  Goal: Verbalizes/displays adequate comfort level or baseline comfort level  Outcome: Progressing  Flowsheets (Taken 5/22/2024 2246)  Verbalizes/displays adequate comfort level or baseline comfort level:   Encourage patient to monitor pain and request assistance   Assess pain using appropriate pain scale   Administer analgesics based on type and severity of pain and evaluate response     Problem: Safety - Adult  Goal: Free from fall injury  Outcome: Progressing  Problem: ABCDS Injury Assessment  Goal: Absence of physical injury  Outcome: Progressing  Flowsheets (Taken 5/22/2024 2246)  Absence of Physical Injury: Implement safety measures based on patient assessment     Free From Fall Injury:   Instruct family/caregiver on patient safety   Based on caregiver fall risk screen, instruct family/caregiver to ask for assistance with transferring infant if caregiver noted to have fall risk factors

## 2024-05-23 NOTE — PROGRESS NOTES
Cardiology Progress Note      Patient:  Lucius Day  YOB: 1955  MRN: [id number]   Acct: [id number]  Admit Date:  5/20/2024  Primary Cardiologist:  none    Note from dr roman \"CHIEF COMPLAINT: Remote fall and rib fracture and pneumothorax     REASON FOR CONSULT: New onset A-fib RVR        HPI: This is a pleasant 68 y.o. male with a past medical history of anxiety who presented to [facility] via [facility][1] on 5/20/2024 after a fall approximately 3 weeks ago in [place] at which time he landed on his right sided chest wall.  He had declined x-rays at his PCPs office, however pain did not resolve and he presented to the ED where it was subsequently found that he had multiple right-sided rib fractures and a large right-sided hemothorax, possible grade 2 liver laceration.  CT A/P on presentation revealed no liver laceration, however confirmed multiple right-sided rib fractures and large right-sided pleural effusion/hemothorax. Patient reports he had no symptoms overnight when he was notified that he was having an abnormal heart rhythm on telemetry. Denies headaches, dyspnea, substernal chest pain/pressure/tightness, palpitations, abdominal pain, changes in bowels/bladder, or edema. He is in NSR at time of exam, denies any known history of heart arrhythmias. Denies any family hx of heart disease.\"    Subjective (Events in last 24 hours): pt awake and alert.  NAD. No cp.   Sob is improving.  No edema or orthopnea  On 2 l/min O2  On cardizem gtt at 5mg/hr, oral cdz just given      Objective:   BP (!) 111/56   Pulse 92   Temp 98.1 °F (36.7 °C) (Oral)   Resp 18   Ht 1.829 m (6')   Wt 86 kg (189 lb 9.5 oz)   SpO2 98%   BMI 25.71 kg/m²        TELEMETRY: nsr    Physical Exam:  General Appearance: alert and oriented to person, place and time, in no acute distress  Cardiovascular: normal rate, regular rhythm, normal S1 and S2, no murmurs, rubs, clicks, or gallops, distal pulses intact, no carotid  Kate Parada PA-C on 5/23/2024 at 1:29 PM

## 2024-05-23 NOTE — PROGRESS NOTES
Pt was in bed as his nurse was with her. I was allowed to pray for him as he was dealing with fall down from steps.    05/23/24 1445   Encounter Summary   Encounter Overview/Reason Initial Encounter   Service Provided For Patient   Referral/Consult From [facility] [place]   Support System Family members   Last Encounter  05/23/24   Complexity of Encounter Low   Begin Time 0955   End Time  1002   Total Time Calculated 7 min   Spiritual/Emotional needs   Type Spiritual Support   Assessment/Intervention/Outcome   Assessment Hopeful   Intervention Empowerment

## 2024-05-23 NOTE — PROGRESS NOTES
Respiratory Care is following the rib fracture order set. Patient's position when testing was done was sitting.  A Negative Inspiratory Force (NIF) was performed with patient achieving a NIF of >-40 cm H2O. The NIF was greater than 25 cm H2O. A Forced Vital Capacity (FVC) was obtained with patient achieving an FVC of 2.55 liters. The patient's calculated ideal body weight, (IBW) is  78 kg. 0.020 liters/kg of the patient's IBW is 1.56 liters. The patient's FVC was greater than 0.020 liters/kg of IBW. Based on the spirometry measurement alone, patient does not meet ICU admission criteria. Previous FVC was 2.39 liters and previous NIF was -35 cm H2O.  Patient's NIF and FVC are improving from previous screening(s).

## 2024-05-23 NOTE — PROGRESS NOTES
[facility]  Trauma Surgery - Dr. Jorge العراقي   Daily Progress Note  Pt Name: Lucius Day  Medical Record Number: [id number]  Date of Birth 1955   Today's Date: 5/23/2024    HD: # 2    CC: Chest wall pain     ASSESSMENT  1.  Active Hospital Problems    Diagnosis Date Noted    Atrial fibrillation with RVR ([facility]) [I48.91] 05/22/2024    Traumatic ecchymosis of multiple sites of lower extremity, initial encounter [S80.10XA] 05/21/2024    Superficial bruising of chest wall, right, initial encounter [S20.211A] 05/21/2024    Subcutaneous emphysema due to trauma, initial encounter ([facility]) [T79.7XXA] 05/21/2024    Fall down steps, initial encounter [W10.8XXA] 05/21/2024    Nicotine dependence with current use [F17.200] 05/21/2024    Alcohol use disorder [F10.90] 05/21/2024    Fall against object [W18.00XA] 05/20/2024    Hemothorax on right [J94.2] 05/20/2024    Multiple fractures of ribs, right side, initial encounter for closed fracture [S22.41XA] 05/20/2024       PROCEDURES  05/21/2024: Right chest tube placement using CT guidance by Interventional Radiology       PLAN  Admitted to Trauma Services     Trauma by remote fall              - Fall precautions              - PT, OT continue to treat     Multiple right sided rib fractures              - CT interpretation of outside images notes acute right 7th through 11th rib fractures, mildly displaced and comminuted              - Rib fracture protocol              - Lidoderm patches              - IS, C&DB              - Pain control              - CXR in AM              - Aggressive pulmonary toileting              - Wean oxygen as able   - 05/21: CXR this AM notes large right-sided pleural effusion with collapse of the right middle and right lower lobes.  Acute right-sided rib fractures.  Still requiring O2 this morning, remains 98% on 2L O2 via n/c. Pain control with Lidoderm patch x 3, Robaxin 1000 mg PO QID, Norco 1-2 tabs q 4 hrs PRN, Morphine  fluid is partly loculated medially. Displaced right lateral 7th rib fracture.  The right 8th through 10th ribs each show displaced fractures in 2 locations, laterally and posteriorly.  There is also a displaced but probably un healed old fracture of the right posterior 11th rib.  Multilevel spondylosis. Suspect 4 x 1.3 cm hematoma in the right hepatic lobe.    Multiple right rib fractures including ribs that some are fractured in more than 1 location; the appearance of some of the fractures is subacute.  Large right pleural effusion which is at least partly a hemothorax.  Pleural nodularity in the setting of malignancy could appear similar but is considered less likely given the history and overall appearance.  Significant adjacent atelectasis in the right lung.  Close follow-up warranted. Suspected hematoma in the right hepatic lobe which could indicate at least grade 2 injury.  Additional imaging/close follow-up would be helpful to better characterize. Critical findings given to Dr. Stiles on 05/20/2024 at 5 p.m. Workstation ID:   [id number]       12 Minutes spent in patient care collectively between subjective/objective examination, chart review, documentation, clinical reasoning and discussion with attending regarding plan/interval changes.    Electronically signed by POLLO Kilgore CNP on 5/23/2024 at 1:33 PM      Patient seen and examined independently by me early in AM on  5/23/2024  Total time personally spent on this patient encounter was 28 minutes which includes :  Preparing to see the patient( reviewing tests and chart)  Obtaining and reviewing separately obtained history  Performing a medically appropriate examination and evaluation  Ordering medications, tests, or procedures  Counseling and educating the patient/family/caregiver  Care coordination  Referring and communicating with other healthcare professionals  Documenting clinical information in the EHR  Independent interpretation of results

## 2024-05-23 NOTE — PROGRESS NOTES
Hospitalist Progress Note    Patient:  Lucius Day      Unit/Bed:4K-16/016-A    YOB: 1955    MRN: [id number]       Acct: [id number]     PCP: Dwayne Nava MD    Date of Admission: 5/20/2024    Assessment/Plan:    New Onset Atrial Fibrillation with RVR: Possibly 2/2 multiple right sided rib fx's, hemothorax and chest tube placement, alcohol withdrawal, or some combination of the above  [id number]   Cardizem gtt initiated for rate control, continues  Cardiology consulted  Started on heparin gtt overnight transition to PO per Cardiology  H&H stable, continue to monitor with hemothorax s/p chest tube placement  Echo ordered    Alcohol Use Disorder: Daily use of Beer and Vodka reported. No history of withdrawal seizures.  CIWA was initiated per primary service, continue and monitor. MVI/Thiamine/Folic acid continue daily.    Multiple acute right sided rib fractures 7-11 with associated right sided hemothorax:  s/p pigtail catheter placement 5/21/24 by IR  Trauma service primary  Rib fracture protocol  Pulmonary hygiene   Chest tube management per primary    Anxiety: history    Essential HTN: History, Diovan have been continued with parameters to hold.      LDA: []CVC / []PICC / []Midline / []Harrison / []Drains / []Mediport / [x]None  Antibiotics: No  Steroids: No  Labs (still needed?): [x]Yes / []No  IVF (still needed?): [x]Yes / []No    Level of care: [x]Step Down / []Med-Surg  Bed Status: [x]Inpatient / []Observation  Telemetry: []Yes / []No  PT/OT: []Yes / []No    DVT Prophylaxis: [] Lovenox / [x] Heparin / [] SCDs / [] Already on Systemic Anticoagulation / [] None     Disposition:    [x] Home       [] TCU       [] Rehab       [] Psych       [] SNF       [] Long Term Care Facility       [] Other-    Chief Complaint: Chest wall pain      Subjective: 68 y.o. male admitted to the trauma service for multiple right sided rib fractures. Patient denies nausea or vomiting.  He continues on Cardizem

## 2024-05-23 NOTE — PROGRESS NOTES
[facility]  INPATIENT PHYSICAL THERAPY  EVALUATION  [facility] ICU STEPDOWN TELEMETRY 4K - 4K-16/016-A    Time In: 909  Time Out: 929  Timed Code Treatment Minutes: 12 Minutes  Minutes: 20          Date: 2024  Patient Name: Lucius Day,  Gender:  male        MRN: [id number]  : 1955  (68 y.o.)      Referring Practitioner: Danelle Rogel, APRN - CNP  Diagnosis: hemothorax on right  Additional Pertinent Hx: Per EMR \"Lucius is a 68 year old male presenting to the Emergency Department via EMS as a transfer in from [facility] for evaluation of injuries sustained in a fall approximately 3 weeks ago while in [place].  He reports that he was at a show in an amphithe[place] when he tripped down one concrete step and fell, landing on the right side of his chest wall.  Reports that he was evaluated by the resort's physician who indicated that he may have \"cracked\" something, but they did not believe that he had broken any bones.  He was given multiple NSAIDs and Prednisone for pain control.  Upon his return from [place], he went to his PCP's office but declined having any x-rays and was given Norco for pain control and advised to discontinue the NSAIDs and Prednisone.  When his pain did not improve, he decided to be evaluated at the ER.  CT images of the chest were obtained and indicated multiple right sided rib fractures, a large right sided hemothorax and possible grade 2 liver laceration.  For this reason, he was transferred for continued care at a Trauma Center.  A dedicated CT of the abdomen and pelvis were obtained after arrival, indicating that there was not a laceration of the liver but review of the outside images confirmed the multiple right sided rib fractures and large right sided pleural effusion/hemothorax.  Lucius reports that he has had dyspnea on exertion and right chest wall pain with movement.  He  does not wear oxygen at home but is currently requiring 2 liters via nasal

## 2024-05-24 ENCOUNTER — APPOINTMENT (OUTPATIENT)
Dept: GENERAL RADIOLOGY | Age: 69
End: 2024-05-24
Payer: MEDICARE

## 2024-05-24 PROCEDURE — 1200000000 HC SEMI PRIVATE

## 2024-05-24 PROCEDURE — 94799 UNLISTED PULMONARY SVC/PX: CPT

## 2024-05-24 PROCEDURE — 71045 X-RAY EXAM CHEST 1 VIEW: CPT

## 2024-05-24 PROCEDURE — 6370000000 HC RX 637 (ALT 250 FOR IP): Performed by: NURSE PRACTITIONER

## 2024-05-24 PROCEDURE — 94010 BREATHING CAPACITY TEST: CPT

## 2024-05-24 PROCEDURE — 6370000000 HC RX 637 (ALT 250 FOR IP): Performed by: OCCUPATIONAL THERAPIST

## 2024-05-24 PROCEDURE — 6370000000 HC RX 637 (ALT 250 FOR IP): Performed by: PHYSICIAN ASSISTANT

## 2024-05-24 PROCEDURE — 99231 SBSQ HOSP IP/OBS SF/LOW 25: CPT | Performed by: SURGERY

## 2024-05-24 PROCEDURE — 0WP9X0Z REMOVAL OF DRAINAGE DEVICE FROM RIGHT PLEURAL CAVITY, EXTERNAL APPROACH: ICD-10-PCS | Performed by: SURGERY

## 2024-05-24 PROCEDURE — 6370000000 HC RX 637 (ALT 250 FOR IP)

## 2024-05-24 PROCEDURE — 99232 SBSQ HOSP IP/OBS MODERATE 35: CPT | Performed by: PHYSICIAN ASSISTANT

## 2024-05-24 PROCEDURE — 2580000003 HC RX 258

## 2024-05-24 RX ORDER — ACETAMINOPHEN 500 MG
1000 TABLET ORAL EVERY 8 HOURS
Status: DISCONTINUED | OUTPATIENT
Start: 2024-05-24 | End: 2024-05-25 | Stop reason: HOSPADM

## 2024-05-24 RX ORDER — OXYCODONE HYDROCHLORIDE 5 MG/1
5 TABLET ORAL
Status: DISCONTINUED | OUTPATIENT
Start: 2024-05-24 | End: 2024-05-25 | Stop reason: HOSPADM

## 2024-05-24 RX ADMIN — SODIUM CHLORIDE, PRESERVATIVE FREE 10 ML: 5 INJECTION INTRAVENOUS at 20:39

## 2024-05-24 RX ADMIN — DILTIAZEM HYDROCHLORIDE 120 MG: 120 CAPSULE, EXTENDED RELEASE ORAL at 09:27

## 2024-05-24 RX ADMIN — OXYCODONE HYDROCHLORIDE 5 MG: 5 TABLET ORAL at 12:00

## 2024-05-24 RX ADMIN — PAROXETINE HYDROCHLORIDE 20 MG: 20 TABLET, FILM COATED ORAL at 09:27

## 2024-05-24 RX ADMIN — FOLIC ACID 1 MG: 1 TABLET ORAL at 09:27

## 2024-05-24 RX ADMIN — APIXABAN 5 MG: 5 TABLET, FILM COATED ORAL at 09:27

## 2024-05-24 RX ADMIN — SODIUM CHLORIDE, PRESERVATIVE FREE 10 ML: 5 INJECTION INTRAVENOUS at 09:27

## 2024-05-24 RX ADMIN — VALSARTAN 160 MG: 320 TABLET, FILM COATED ORAL at 09:28

## 2024-05-24 RX ADMIN — FAMOTIDINE 20 MG: 20 TABLET, FILM COATED ORAL at 20:39

## 2024-05-24 RX ADMIN — METHOCARBAMOL 1000 MG: 500 TABLET ORAL at 09:27

## 2024-05-24 RX ADMIN — OXYCODONE HYDROCHLORIDE 5 MG: 5 TABLET ORAL at 18:27

## 2024-05-24 RX ADMIN — METHOCARBAMOL 1000 MG: 500 TABLET ORAL at 17:20

## 2024-05-24 RX ADMIN — Medication 100 MG: at 09:27

## 2024-05-24 RX ADMIN — ACETAMINOPHEN 1000 MG: 500 TABLET ORAL at 20:39

## 2024-05-24 RX ADMIN — FAMOTIDINE 20 MG: 20 TABLET, FILM COATED ORAL at 09:26

## 2024-05-24 RX ADMIN — APIXABAN 5 MG: 5 TABLET, FILM COATED ORAL at 20:39

## 2024-05-24 RX ADMIN — ACETAMINOPHEN 1000 MG: 500 TABLET ORAL at 12:00

## 2024-05-24 RX ADMIN — METHOCARBAMOL 1000 MG: 500 TABLET ORAL at 12:00

## 2024-05-24 RX ADMIN — Medication 1.5 MG: at 23:15

## 2024-05-24 RX ADMIN — OXYCODONE HYDROCHLORIDE 5 MG: 5 TABLET ORAL at 23:15

## 2024-05-24 RX ADMIN — METHOCARBAMOL 1000 MG: 500 TABLET ORAL at 20:38

## 2024-05-24 RX ADMIN — Medication 1 TABLET: at 09:27

## 2024-05-24 ASSESSMENT — PAIN DESCRIPTION - LOCATION
LOCATION: CHEST
LOCATION: ABDOMEN
LOCATION: CHEST

## 2024-05-24 ASSESSMENT — PAIN SCALES - GENERAL
PAINLEVEL_OUTOF10: 8
PAINLEVEL_OUTOF10: 0
PAINLEVEL_OUTOF10: 0
PAINLEVEL_OUTOF10: 7
PAINLEVEL_OUTOF10: 0
PAINLEVEL_OUTOF10: 0
PAINLEVEL_OUTOF10: 7

## 2024-05-24 ASSESSMENT — PAIN DESCRIPTION - ORIENTATION
ORIENTATION: MID
ORIENTATION: RIGHT
ORIENTATION: RIGHT

## 2024-05-24 ASSESSMENT — PAIN - FUNCTIONAL ASSESSMENT: PAIN_FUNCTIONAL_ASSESSMENT: ACTIVITIES ARE NOT PREVENTED

## 2024-05-24 ASSESSMENT — PAIN DESCRIPTION - DESCRIPTORS
DESCRIPTORS: ACHING;SORE
DESCRIPTORS: ACHING;DISCOMFORT
DESCRIPTORS: ACHING

## 2024-05-24 NOTE — PLAN OF CARE
Patient's VSS, continues on 1L O2 via NC. Cardiology managing new onset A-fib with RVR. Continue medications per their recommendations. Trauma service managing chest tube.     Hospitalist service will sign off at this time. Thank you for the consultation and allowing us to partake in the care of this patient. Please feel free to contact us with any questions or concerns.    Electronically signed by Pradip Johns PA-C on 5/24/2024 at 4:09 PM

## 2024-05-24 NOTE — PROGRESS NOTES
Respiratory Care is following the rib fracture order set. Patient's position when testing was done was HF in bed.  A Negative Inspiratory Force (NIF) was performed with patient achieving a NIF of >--40 cm H2O. The NIF was greater than 25 cm H2O. A Forced Vital Capacity (FVC) was obtained with patient achieving an FVC of 3.09 liters. The patient's calculated ideal body weight, (IBW) is  78 kg. 0.020 liters/kg of the patient's IBW is 1.56 liters. The patient's FVC was greater than 0.020 liters/kg of IBW. Based on the spirometry measurement alone, patient does not meet ICU admission criteria. Previous FVC was 2.53 liters and previous NIF was >-40 cm H2O.  Patient's NIF and FVC show no change from previous screening(s). Last pain medication was given on  5/24 @ 1200. Physician was not called regarding spirometry measurement.

## 2024-05-24 NOTE — CARE COORDINATION
5/24/24, 9:42 AM EDT    Patient goals/plan/ treatment preferences discussed by  and .  Patient goals/plan/ treatment preferences reviewed with patient/ family.  Patient/ family verbalize understanding of discharge plan and are in agreement with goal/plan/treatment preferences.  Understanding was demonstrated using the teach back method.  AVS provided by RN at time of discharge, which includes all necessary medical information pertaining to the patients current course of illness, treatment, post-discharge goals of care, and treatment preferences.     Services At/After Discharge: None

## 2024-05-24 NOTE — PROGRESS NOTES
Cardiology Progress Note      Patient:  Lucius Day  YOB: 1955  MRN: [id number]   Acct: [id number]  Admit Date:  5/20/2024  Primary Cardiologist:  none    Note from dr roman \"CHIEF COMPLAINT: Remote fall and rib fracture and pneumothorax     REASON FOR CONSULT: New onset A-fib RVR        HPI: This is a pleasant 68 y.o. male with a past medical history of anxiety who presented to [facility] via [facility] on 5/20/2024 after a fall approximately 3 weeks ago in [place] at which time he landed on his right sided chest wall.  He had declined x-rays at his PCPs office, however pain did not resolve and he presented to the ED where it was subsequently found that he had multiple right-sided rib fractures and a large right-sided hemothorax, possible grade 2 liver laceration.  CT A/P on presentation revealed no liver laceration, however confirmed multiple right-sided rib fractures and large right-sided pleural effusion/hemothorax. Patient reports he had no symptoms overnight when he was notified that he was having an abnormal heart rhythm on telemetry. Denies headaches, dyspnea, substernal chest pain/pressure/tightness, palpitations, abdominal pain, changes in bowels/bladder, or edema. He is in NSR at time of exam, denies any known history of heart arrhythmias. Denies any family hx of heart   Subjective (Events in last 24 hours):   Pt has remained in NSR.  BP has remained stable.  Continues to have right sided chest tube.    Objective:  /74   Pulse 90   Temp 98.4 °F (36.9 °C) (Oral)   Resp 16   Ht 1.829 m (6')   Wt 87 kg (191 lb 12.8 oz)   SpO2 98%   BMI 26.01 kg/m²        TELEMETRY: NSR    Physical Exam:  General Appearance: alert and oriented to person, place and time, in no acute distress  Cardiovascular: normal rate, regular rhythm, normal S1 and S2, no murmurs, rubs, clicks, or gallops, distal pulses intact, no carotid bruits, no JVD  Pulmonary/Chest: right sided CT  Abdomen: soft,

## 2024-05-24 NOTE — CARE COORDINATION
5/24/24, 1:57 PM EDT    Patient goals/plan/ treatment preferences discussed by  and .  Patient goals/plan/ treatment preferences reviewed with patient/ family.  Patient/ family verbalize understanding of discharge plan and are in agreement with goal/plan/treatment preferences.  Understanding was demonstrated using the teach back method.  AVS provided by RN at time of discharge, which includes all necessary medical information pertaining to the patients current course of illness, treatment, post-discharge goals of care, and treatment preferences.     Services At/After Discharge: None  Plans home w spouse Roberta when medically cleared (right chest tube removal planned today or tomorrow), therapy following, still drives, refused HH; ambulates around unit

## 2024-05-24 NOTE — PROGRESS NOTES
[facility]  Trauma Surgery - Dr. Jorge العراقي   Daily Progress Note  Pt Name: Lucius Day  Medical Record Number: [id number]  Date of Birth 1955   Today's Date: 5/24/2024    HD: # 3    CC: Chest wall pain     ASSESSMENT  1.  Active Hospital Problems    Diagnosis Date Noted    Atrial fibrillation with RVR ([facility]) [I48.91] 05/22/2024    Traumatic ecchymosis of multiple sites of lower extremity, initial encounter [S80.10XA] 05/21/2024    Superficial bruising of chest wall, right, initial encounter [S20.211A] 05/21/2024    Subcutaneous emphysema due to trauma, initial encounter ([facility]) [T79.7XXA] 05/21/2024    Fall down steps, initial encounter [W10.8XXA] 05/21/2024    Nicotine dependence with current use [F17.200] 05/21/2024    Alcohol use disorder [F10.90] 05/21/2024    Fall against object [W18.00XA] 05/20/2024    Hemothorax on right [J94.2] 05/20/2024    Multiple fractures of ribs, right side, initial encounter for closed fracture [S22.41XA] 05/20/2024       PROCEDURES  05/21/2024: Right chest tube placement using CT guidance by Interventional Radiology  05/24/2024: Chest tube removal        PLAN  Admitted to Trauma Services     Trauma by remote fall              - Fall precautions              - PT, OT continue to treat     Multiple right sided rib fractures              - CT interpretation of outside images notes acute right 7th through 11th rib fractures, mildly displaced and comminuted              - Rib fracture protocol              - Lidoderm patches              - IS, C&DB              - Pain control              - CXR in AM              - Aggressive pulmonary toileting              - Wean oxygen as able   - 05/21: CXR this AM notes large right-sided pleural effusion with collapse of the right middle and right lower lobes.  Acute right-sided rib fractures.  Still requiring O2 this morning, remains 98% on 2L O2 via n/c. Pain control with Lidoderm patch x 3, Robaxin 1000 mg PO QID, Norco

## 2024-05-24 NOTE — PLAN OF CARE
Care plan reviewed with patient and family.  Patient and family verbalize understanding of the plan of care and contribute to goal setting.       Problem: Discharge Planning  Goal: Discharge to home or other facility with appropriate resources  Outcome: Progressing  Flowsheets (Taken 5/23/2024 0812)  Discharge to home or other facility with appropriate resources:   Identify barriers to discharge with patient and caregiver   Arrange for needed discharge resources and transportation as appropriate   Identify discharge learning needs (meds, wound care, etc)     Problem: Pain  Goal: Verbalizes/displays adequate comfort level or baseline comfort level  Outcome: Progressing  Flowsheets (Taken 5/23/2024 0812)  Verbalizes/displays adequate comfort level or baseline comfort level:   Encourage patient to monitor pain and request assistance   Assess pain using appropriate pain scale   Administer analgesics based on type and severity of pain and evaluate response     Problem: Safety - Adult  Goal: Free from fall injury  Flowsheets (Taken 5/23/2024 2039)  Free From Fall Injury:   Instruct family/caregiver on patient safety   Based on caregiver fall risk screen, instruct family/caregiver to ask for assistance with transferring infant if caregiver noted to have fall risk factors

## 2024-05-25 ENCOUNTER — APPOINTMENT (OUTPATIENT)
Dept: GENERAL RADIOLOGY | Age: 69
End: 2024-05-25
Payer: MEDICARE

## 2024-05-25 VITALS
TEMPERATURE: 98.7 F | HEART RATE: 87 BPM | HEIGHT: 72 IN | SYSTOLIC BLOOD PRESSURE: 159 MMHG | WEIGHT: 173.8 LBS | RESPIRATION RATE: 16 BRPM | OXYGEN SATURATION: 97 % | BODY MASS INDEX: 23.54 KG/M2 | DIASTOLIC BLOOD PRESSURE: 89 MMHG

## 2024-05-25 LAB
ANION GAP SERPL CALC-SCNC: 7 MEQ/L (ref 8–16)
BUN SERPL-MCNC: 12 MG/DL (ref 7–22)
CALCIUM SERPL-MCNC: 8.4 MG/DL (ref 8.5–10.5)
CHLORIDE SERPL-SCNC: 103 MEQ/L (ref 98–111)
CO2 SERPL-SCNC: 25 MEQ/L (ref 23–33)
CREAT SERPL-MCNC: 0.7 MG/DL (ref 0.4–1.2)
GFR SERPL CREATININE-BSD FRML MDRD: > 90 ML/MIN/1.73M2
GLUCOSE SERPL-MCNC: 101 MG/DL (ref 70–108)
POTASSIUM SERPL-SCNC: 4.1 MEQ/L (ref 3.5–5.2)
SODIUM SERPL-SCNC: 135 MEQ/L (ref 135–145)

## 2024-05-25 PROCEDURE — 6370000000 HC RX 637 (ALT 250 FOR IP): Performed by: PHYSICIAN ASSISTANT

## 2024-05-25 PROCEDURE — 80048 BASIC METABOLIC PNL TOTAL CA: CPT

## 2024-05-25 PROCEDURE — 99239 HOSP IP/OBS DSCHRG MGMT >30: CPT | Performed by: SURGERY

## 2024-05-25 PROCEDURE — 71045 X-RAY EXAM CHEST 1 VIEW: CPT

## 2024-05-25 PROCEDURE — 6370000000 HC RX 637 (ALT 250 FOR IP): Performed by: NURSE PRACTITIONER

## 2024-05-25 PROCEDURE — 2580000003 HC RX 258

## 2024-05-25 PROCEDURE — 6370000000 HC RX 637 (ALT 250 FOR IP)

## 2024-05-25 PROCEDURE — 6370000000 HC RX 637 (ALT 250 FOR IP): Performed by: OCCUPATIONAL THERAPIST

## 2024-05-25 PROCEDURE — 36415 COLL VENOUS BLD VENIPUNCTURE: CPT

## 2024-05-25 RX ORDER — FAMOTIDINE 20 MG/1
20 TABLET, FILM COATED ORAL 2 TIMES DAILY
Qty: 60 TABLET | Refills: 3 | Status: SHIPPED | OUTPATIENT
Start: 2024-05-25

## 2024-05-25 RX ORDER — LANOLIN ALCOHOL/MO/W.PET/CERES
100 CREAM (GRAM) TOPICAL DAILY
Qty: 30 TABLET | Refills: 3 | Status: SHIPPED | OUTPATIENT
Start: 2024-05-26

## 2024-05-25 RX ORDER — PAROXETINE HYDROCHLORIDE 20 MG/1
20 TABLET, FILM COATED ORAL EVERY MORNING
Qty: 30 TABLET | Refills: 0 | Status: SHIPPED | OUTPATIENT
Start: 2024-05-25

## 2024-05-25 RX ORDER — METHOCARBAMOL 1000 MG/1
1000 TABLET, COATED ORAL 4 TIMES DAILY
Qty: 28 TABLET | Refills: 0 | Status: SHIPPED | OUTPATIENT
Start: 2024-05-25 | End: 2024-05-31 | Stop reason: ALTCHOICE

## 2024-05-25 RX ORDER — FOLIC ACID 1 MG/1
1 TABLET ORAL DAILY
Qty: 30 TABLET | Refills: 3 | Status: SHIPPED | OUTPATIENT
Start: 2024-05-26

## 2024-05-25 RX ORDER — LIDOCAINE 4 G/G
1 PATCH TOPICAL DAILY
Qty: 14 PATCH | Refills: 0 | Status: SHIPPED | OUTPATIENT
Start: 2024-05-26

## 2024-05-25 RX ORDER — MULTIVITAMIN WITH IRON
1 TABLET ORAL DAILY
Qty: 30 TABLET | Refills: 0 | Status: SHIPPED | OUTPATIENT
Start: 2024-05-26

## 2024-05-25 RX ADMIN — VALSARTAN 160 MG: 320 TABLET, FILM COATED ORAL at 08:59

## 2024-05-25 RX ADMIN — FOLIC ACID 1 MG: 1 TABLET ORAL at 08:59

## 2024-05-25 RX ADMIN — DILTIAZEM HYDROCHLORIDE 120 MG: 120 CAPSULE, EXTENDED RELEASE ORAL at 08:59

## 2024-05-25 RX ADMIN — ACETAMINOPHEN 1000 MG: 500 TABLET ORAL at 05:08

## 2024-05-25 RX ADMIN — OXYCODONE HYDROCHLORIDE 5 MG: 5 TABLET ORAL at 08:58

## 2024-05-25 RX ADMIN — FAMOTIDINE 20 MG: 20 TABLET, FILM COATED ORAL at 08:58

## 2024-05-25 RX ADMIN — Medication 1 TABLET: at 08:59

## 2024-05-25 RX ADMIN — Medication 100 MG: at 09:00

## 2024-05-25 RX ADMIN — METHOCARBAMOL 1000 MG: 500 TABLET ORAL at 08:59

## 2024-05-25 RX ADMIN — SODIUM CHLORIDE, PRESERVATIVE FREE 10 ML: 5 INJECTION INTRAVENOUS at 08:58

## 2024-05-25 RX ADMIN — APIXABAN 5 MG: 5 TABLET, FILM COATED ORAL at 08:59

## 2024-05-25 RX ADMIN — OXYCODONE HYDROCHLORIDE 5 MG: 5 TABLET ORAL at 12:17

## 2024-05-25 RX ADMIN — PAROXETINE HYDROCHLORIDE 20 MG: 20 TABLET, FILM COATED ORAL at 08:59

## 2024-05-25 ASSESSMENT — PAIN DESCRIPTION - ONSET: ONSET: ON-GOING

## 2024-05-25 ASSESSMENT — PAIN DESCRIPTION - LOCATION: LOCATION: CHEST

## 2024-05-25 ASSESSMENT — PAIN DESCRIPTION - DIRECTION: RADIATING_TOWARDS: N

## 2024-05-25 ASSESSMENT — PAIN DESCRIPTION - FREQUENCY: FREQUENCY: INTERMITTENT

## 2024-05-25 ASSESSMENT — PAIN SCALES - GENERAL
PAINLEVEL_OUTOF10: 7
PAINLEVEL_OUTOF10: 0

## 2024-05-25 ASSESSMENT — PAIN DESCRIPTION - DESCRIPTORS: DESCRIPTORS: ACHING;SORE

## 2024-05-25 ASSESSMENT — PAIN - FUNCTIONAL ASSESSMENT: PAIN_FUNCTIONAL_ASSESSMENT: ACTIVITIES ARE NOT PREVENTED

## 2024-05-25 ASSESSMENT — PAIN DESCRIPTION - PAIN TYPE: TYPE: ACUTE PAIN

## 2024-05-25 ASSESSMENT — PAIN DESCRIPTION - ORIENTATION: ORIENTATION: RIGHT

## 2024-05-25 NOTE — DISCHARGE SUMMARY
It may contain minor errors which are inherent in voice recognition technology.**    CT ABDOMEN PELVIS W IV CONTRAST Additional Contrast? None    Result Date: 5/21/2024  CT abdomen and pelvis with contrast Comparison: CT/SR - CT INTERPRETATION OF OUTSIDE IMAGES - 05/20/2024 03:27 PM EDT Findings: Partially seen large simple attenuation right pleural effusion causing compressive atelectasis of the right lower lung and right middle lung. Small triangular shaped hypodensity in the subcapsular area of segment 6 of the liver. No suspicious perihepatic fluid collections or hyperdense ascites. Gallbladder is unremarkable. No biliary dilatation. The portal and splenic veins appear patent. The spleen, pancreas, and adrenal glands are unremarkable. Both kidneys enhance symmetrically without hydroureteronephrosis. No bowel obstruction, pneumoperitoneum, or pneumatosis. The appendix is normal. Colonic diverticula without acute diverticulitis. Calcified plaque in the nonaneurysmal aorta and iliac arteries. There are no enlarged abdominal or pelvic lymph nodes. Excreted contrast in the urinary bladder. Prostate is unremarkable. No free fluid in the pelvis. There are no aggressive osseous lesions.     1. Small triangular shaped subcapsular hypodensity in segment 6 of the liver. No suspicious hyperdense perihepatic fluid or hemorrhagic ascites to suggest liver laceration. This may reflect a normal diaphragmatic slip, but can be followed up if there is worsening abdominal pain. 2. Colonic diverticula. 3. Partially seen large simple attenuation right pleural effusion, causing compressive atelectasis of the right middle and lower lungs. This document has been electronically signed by: Daron Cagle MD on 05/21/2024 12:13 AM All CTs at this facility use dose modulation techniques and iterative reconstructions, and/or weight-based dosing when appropriate to reduce radiation to a low as reasonably achievable.    CT INTERPRETATION OF  today  Dr. العراقي signout for rounding reviewed  Patient is stable his chest tubes are out removed yesterday.  Chest x-ray still shows basilar pleural thickening and atelectasis  White count has been normal as of May 23  Pain was controlled on oral regimen  New onset atrial fibrillation was seen by cardiology started on oral anticoagulants  If his effusion recurs likely has trapped lung and will need decortication  Patient is stable for discharge  Follow-ups have been arranged  Total time spent reviewing old records imaging hospital course and summarization for this discharge summary 35 minutes

## 2024-05-25 NOTE — PROGRESS NOTES
Discharge teaching and instructions for diagnosis/procedure of fall completed with patient using teachback method. AVS reviewed. Printed prescriptions given to patient. Patient voiced understanding regarding prescriptions, follow up appointments, and care of self at home. Discharged ambulatory to  independent living per family

## 2024-05-31 ENCOUNTER — OFFICE VISIT (OUTPATIENT)
Dept: SURGERY | Age: 69
End: 2024-05-31
Payer: MEDICARE

## 2024-05-31 VITALS
BODY MASS INDEX: 23.88 KG/M2 | TEMPERATURE: 97.4 F | WEIGHT: 176.3 LBS | HEIGHT: 72 IN | DIASTOLIC BLOOD PRESSURE: 84 MMHG | RESPIRATION RATE: 18 BRPM | HEART RATE: 102 BPM | SYSTOLIC BLOOD PRESSURE: 128 MMHG | OXYGEN SATURATION: 96 %

## 2024-05-31 DIAGNOSIS — S22.41XA MULTIPLE FRACTURES OF RIBS, RIGHT SIDE, INITIAL ENCOUNTER FOR CLOSED FRACTURE: ICD-10-CM
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
## 2024-05-31 DIAGNOSIS — S22.41XG: ICD-10-CM

## 2024-05-31 PROCEDURE — 99214 OFFICE O/P EST MOD 30 MIN: CPT | Performed by: NURSE PRACTITIONER

## 2024-05-31 PROCEDURE — 1123F ACP DISCUSS/DSCN MKR DOCD: CPT | Performed by: NURSE PRACTITIONER

## 2024-05-31 RX ORDER — METHOCARBAMOL 750 MG/1
750 TABLET, FILM COATED ORAL 4 TIMES DAILY
Qty: 84 TABLET | Refills: 0 | Status: SHIPPED | OUTPATIENT
Start: 2024-05-31 | End: 2024-06-21

## 2024-05-31 RX ORDER — HYDROCODONE BITARTRATE AND ACETAMINOPHEN 5; 325 MG/1; MG/1
1 TABLET ORAL EVERY 8 HOURS PRN
COMMUNITY
Start: 2024-05-17 | End: 2024-05-31 | Stop reason: ALTCHOICE

## 2024-05-31 RX ORDER — HYDROCODONE BITARTRATE AND ACETAMINOPHEN 5; 325 MG/1; MG/1
1 TABLET ORAL EVERY 6 HOURS PRN
Qty: 20 TABLET | Refills: 0 | Status: SHIPPED | OUTPATIENT
Start: 2024-05-31 | End: 2024-06-05

## 2024-05-31 RX ORDER — CALCIUM CARBONATE/VITAMIN D3 600 MG-10
1 TABLET ORAL DAILY
COMMUNITY
Start: 2024-05-26

## 2024-06-12 ENCOUNTER — OFFICE VISIT (OUTPATIENT)
Dept: CARDIOLOGY CLINIC | Age: 69
End: 2024-06-12

## 2024-06-12 VITALS
HEART RATE: 86 BPM | SYSTOLIC BLOOD PRESSURE: 169 MMHG | HEIGHT: 72 IN | WEIGHT: 176 LBS | DIASTOLIC BLOOD PRESSURE: 101 MMHG | BODY MASS INDEX: 23.84 KG/M2

## 2024-06-12 DIAGNOSIS — Z72.0 TOBACCO ABUSE: ICD-10-CM

## 2024-06-12 DIAGNOSIS — I48.0 PAROXYSMAL ATRIAL FIBRILLATION (HCC): Primary | ICD-10-CM

## 2024-06-12 DIAGNOSIS — I10 PRIMARY HYPERTENSION: ICD-10-CM

## 2024-06-12 RX ORDER — VALSARTAN 160 MG/1
160 TABLET ORAL DAILY
Qty: 90 TABLET | Refills: 2 | Status: SHIPPED | OUTPATIENT
Start: 2024-06-12

## 2024-06-12 RX ORDER — DILTIAZEM HYDROCHLORIDE 120 MG/1
120 CAPSULE, COATED, EXTENDED RELEASE ORAL DAILY
Qty: 90 CAPSULE | Refills: 2 | Status: SHIPPED | OUTPATIENT
Start: 2024-06-12

## 2024-06-12 NOTE — PROGRESS NOTES
[facility] [place] [facility]  [facility] CARDIOLOGY  [address]  [address]  [place] [facility] [address]  Dept: [phone]  Dept Fax: [phone]  Loc: [phone]    Chief Complaint   Patient presents with    Follow-Up from Hospital     Echo 5/22/24     Patient presents for follow-up appointment after recent hospitalization for fall with rib fractures and new onset atrial fibrillation.  Patient states that he continues to recover from right-sided rib fractures.  He has not had any known issues with atrial fibrillation.  He denies any chest pain, shortness of breath or palpitations.  He was not sent home with Cardizem which was started in the hospital.  He continues on Eliquis.  He continues to smoke despite our recommendations to stop.  He is a daily alcohol user.  Cardiologist:  Dr. Villareal        General:   No fever, no chills, No fatigue or weight loss  Pulmonary:    No dyspnea, no wheezing  Cardiac:    Denies recent chest pain   GI:     No nausea or vomiting, no abdominal pain  Neuro:    No dizziness or light headedness  Musculoskeletal: Right-sided rib pain   extremities:   No edema, good peripheral pulses      Past Medical History:   Diagnosis Date    Anxiety        Allergies   Allergen Reactions    Hydrochlorothiazide Other (See Comments) and Rash     \"Dehydrates me\"       Current Outpatient Medications   Medication Sig Dispense Refill    dilTIAZem (CARDIZEM CD) 120 MG extended release capsule Take 1 capsule by mouth daily 90 capsule 2    valsartan (DIOVAN) 160 MG tablet Take 1 tablet by mouth daily 90 tablet 2    Thiamine Mononitrate (B1) 100 MG TABS Take 1 tablet by mouth daily      methocarbamol (ROBAXIN-750) 750 MG tablet Take 1 tablet by mouth 4 times daily for 21 days 84 tablet 0    apixaban (ELIQUIS) 5 MG TABS tablet Take 1 tablet by mouth 2 times daily 60 tablet 0    PARoxetine (PAXIL) 20 MG tablet Take 1 tablet by mouth every morning 30 tablet 0    lidocaine 4 %

## 2024-07-09 ASSESSMENT — ENCOUNTER SYMPTOMS
SORE THROAT: 0
SHORTNESS OF BREATH: 0
VOMITING: 0
TROUBLE SWALLOWING: 0
ABDOMINAL PAIN: 0
WHEEZING: 0
PHOTOPHOBIA: 0
CHOKING: 0
APNEA: 0
CHEST TIGHTNESS: 0
NAUSEA: 0
BLOOD IN STOOL: 0
DIARRHEA: 0
EYE DISCHARGE: 0
SINUS PRESSURE: 0
BACK PAIN: 0
EYE PAIN: 0
RHINORRHEA: 0
ABDOMINAL DISTENTION: 0
STRIDOR: 0
COUGH: 0
VOICE CHANGE: 0
EYE ITCHING: 0
FACIAL SWELLING: 0
EYE REDNESS: 0
CONSTIPATION: 0
COLOR CHANGE: 0

## 2024-07-09 NOTE — PROGRESS NOTES
[facility]  Danelle Rogel, APRN - CNP    Encounter Date: 5/31/2024  Patient:  Lucius Day   Age: 68 y.o.   YOB: 1955   MRN: [id number]      Injury Date:~05/07/2024    PCP: Dwayne Nava MD     Subjective   Chief Complaint:   Chief Complaint   Patient presents with    Follow-Up from Hospital     Admit on 5/20/24, d/c on 5/25/24    Trauma      5/20/24 to [facility] ER Fall against object 2 weeks ago on vacation in [place].     Hemothorax on right side, Multiple fractures of ribs, right side, initial encounter for closed fracture         History Obtained From: chart review and the patient  Reason for Admission: Active Problems:    * No active hospital problems. *  Resolved Problems:    * No resolved hospital problems. *    History of Present Illness:  He is a 68 y.o. male who presents for follow up after being discharged from the hospital for treatment of a right sided hemothorax and rib fractures.  He had been on vacation in [place] and had fallen onto some concrete steps while in an amphitheater causing multiple right sided rib fractures.  He did not seek treatment right away as he was in [place] but did see his PCP when he returned. His pain continued so he did go to the hospital for evaluation on 05/20/24 where he was noted to have a right sided hemothorax along with multiple right sided rib fractures.  He was transferred from [facility] for continued treatment and evaluation of his traumatic injuries.  The hemothorax was evacuated by a small bore chest tube inserted by Interventional Radiology on 05/21.  The chest tube remained in place for a few days to evacuate the hemothorax and assure there was no pneumothorax and was removed on 05/24.  He was subsequently discharged home on 05/25.  Since discharge he has been doing well.  Pain is fairly well controlled.  He was not able to pick his prescription up for Robaxin due to cost.  I have sent in a different dosage of Robaxin and provided